# Patient Record
Sex: MALE | Race: WHITE | Employment: FULL TIME | URBAN - METROPOLITAN AREA
[De-identification: names, ages, dates, MRNs, and addresses within clinical notes are randomized per-mention and may not be internally consistent; named-entity substitution may affect disease eponyms.]

---

## 2022-10-11 ENCOUNTER — HOSPITAL ENCOUNTER (EMERGENCY)
Age: 47
Discharge: HOME | End: 2022-10-11
Attending: EMERGENCY MEDICINE

## 2022-10-11 ENCOUNTER — APPOINTMENT (OUTPATIENT)
Dept: ULTRASOUND IMAGING | Age: 47
End: 2022-10-11

## 2022-10-11 VITALS
HEIGHT: 68 IN | TEMPERATURE: 97.1 F | HEART RATE: 85 BPM | RESPIRATION RATE: 16 BRPM | DIASTOLIC BLOOD PRESSURE: 71 MMHG | WEIGHT: 200 LBS | BODY MASS INDEX: 30.31 KG/M2 | SYSTOLIC BLOOD PRESSURE: 138 MMHG | OXYGEN SATURATION: 97 %

## 2022-10-11 DIAGNOSIS — M71.22 BAKER'S CYST OF KNEE, LEFT: Primary | ICD-10-CM

## 2022-10-11 PROCEDURE — 93971 EXTREMITY STUDY: CPT

## 2022-10-11 PROCEDURE — 99284 EMERGENCY DEPT VISIT MOD MDM: CPT

## 2022-10-11 NOTE — ED NOTES
Pt states that his left lower calf has been very warm to touch and swollen, he also states that the swelling has increased to that leg, the area is red and warm to touch and tender, he denies shortness of breath.       Curly Cabrera RN  10/11/22 5431

## 2022-10-11 NOTE — ED PROVIDER NOTES
ED Attending shared visit  CC: No                                                                                                                                        Department of Emergency Medicine   ED  Provider Note  Admit Date/RoomTime: 10/11/2022 10:39 AM  ED Room: 34/34        HPI:  10/11/22,   Time: 10:39 AM EDT         Yoselin Hines is a 50 y.o. male presenting to the ED for left calf, beginning 10 days ago. The complaint has been persistent, moderate in severity, and worsened by nothing. 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 He states that he does not know of any injury or trauma. He started noticing some swelling in the leg out 10 days ago and has persisted. He tried to go to another facility yesterday but was told that he probably be waiting overnight to be seen. The patient is generally healthy. He denies any history of prior blood clots. No recent surgery. Denies chest pain or shortness of breath. States that he feels fine otherwise. ROS:     Constitutional: Negative for fever and chills  HENT: Negative for ear pain, sore throat and sinus pressure  Eyes: Negative for pain, discharge and redness  Respiratory:  Negative for shortness of breath, cough and wheezing  Cardiovascular: Negative for CP, edema or palpitations  Gastrointestinal: Negative for nausea, vomiting, diarrhea and abdominal distention  Genitourinary: Negative for dysuria and frequency  Musculoskeletal: See HPI  Skin: Negative for rash and wound  Neurological: Negative for weakness and headaches  Hematological: Negative for adenopathy    All other systems reviewed and are negative      -------------------------------- PAST HISTORY ----------------------------------  Past Medical History:  has no past medical history on file. Past Surgical History:  has no past surgical history on file.     Social History:      Family History: family history is not on file. The patients home medications have been reviewed. Allergies: Patient has no known allergies. --------------------------------- RESULTS ------------------------------------------  All laboratory and radiology results have been personally reviewed by myself   LABS:  No results found for this visit on 10/11/22. RADIOLOGY:  Interpreted by Radiologist.  US DUP LOWER EXTREMITY LEFT HUNTER   Final Result   No evidence of DVT in the left lower extremity.             ----------------- NURSING NOTES AND VITALS REVIEWED ---------------   The nursing notes within the ED encounter and vital signs as below have been reviewed. BP (!) 149/100   Pulse 92   Temp 97.6 °F (36.4 °C) (Tympanic)   Resp 18   Ht 5' 8\" (1.727 m)   Wt 200 lb (90.7 kg)   SpO2 97%   BMI 30.41 kg/m²   Oxygen Saturation Interpretation: Normal      --------------------------------PHYSICAL EXAM------------------------------------      Constitutional/General: Alert and oriented x3, well appearing, non toxic in NAD  Head: NC/AT  Eyes: PERRL, EOMI  Mouth: Oropharynx clear, handling secretions, no trismus  Neck: Supple, full ROM, no meningeal signs  Pulmonary: Lungs clear to auscultation bilaterally, no wheezes, rales, or rhonchi. Not in respiratory distress  Cardiovascular:  Regular rate and rhythm, no murmurs, gallops, or rubs. 2+ distal pulses  Abdomen: Soft, + BS. No distension. Nontender. No palpable rigidity, rebound or guarding  Extremities: Moves all extremities x 4. Marked swelling noted in left calf compared to right. No visible redness or rash. Left calf is tender to palpation. Warm and well perfused  Skin: warm and dry without rash  Neurologic: GCS 15,  Intact.   No focal deficits  Psych: Normal Affect      ------------------------ ED COURSE/MEDICAL DECISION MAKING----------------------  Medications - No data to display      Medical Decision Making:    Pt arrives with pain and swelling in left LE. Duplex negative for DVT. Pt does have large Baker's cyst.   Likely ruptured now. Discussed treatment plan. F/U Ortho as needed if persistent pain/symptoms. Swelling will take awhile to improve. Counseling: The emergency provider has spoken with the patient and discussed todays results, in addition to providing specific details for the plan of care and counseling regarding the diagnosis and prognosis. Questions are answered at this time and they are agreeable with the plan.      ------------------------ IMPRESSION AND DISPOSITION -------------------------------    IMPRESSION  1.  Baker's cyst of knee, left        DISPOSITION  Disposition: Discharge to home  Patient condition is stable                   Karma Martinez PA-C  10/11/22 1138       Karma Martinez PA-C  10/11/22 1147

## 2022-11-20 ENCOUNTER — APPOINTMENT (OUTPATIENT)
Dept: CT IMAGING | Age: 47
DRG: 394 | End: 2022-11-20

## 2022-11-20 ENCOUNTER — ANESTHESIA EVENT (OUTPATIENT)
Dept: OPERATING ROOM | Age: 47
DRG: 394 | End: 2022-11-20

## 2022-11-20 ENCOUNTER — HOSPITAL ENCOUNTER (INPATIENT)
Age: 47
LOS: 1 days | Discharge: HOME | DRG: 394 | End: 2022-11-21
Attending: EMERGENCY MEDICINE | Admitting: INTERNAL MEDICINE

## 2022-11-20 ENCOUNTER — ANESTHESIA (OUTPATIENT)
Dept: OPERATING ROOM | Age: 47
DRG: 394 | End: 2022-11-20

## 2022-11-20 DIAGNOSIS — A41.9 SEPTICEMIA (HCC): ICD-10-CM

## 2022-11-20 DIAGNOSIS — L02.91 PHLEGMON: Primary | ICD-10-CM

## 2022-11-20 DIAGNOSIS — L02.91 ABSCESS: ICD-10-CM

## 2022-11-20 PROBLEM — R03.0 ELEVATED BP WITHOUT DIAGNOSIS OF HYPERTENSION: Status: ACTIVE | Noted: 2022-11-20

## 2022-11-20 PROBLEM — Z78.9 ALCOHOL USE: Status: ACTIVE | Noted: 2022-11-20

## 2022-11-20 PROBLEM — R79.89 ELEVATED LACTIC ACID LEVEL: Status: ACTIVE | Noted: 2022-11-20

## 2022-11-20 LAB
ALBUMIN SERPL-MCNC: 4.6 G/DL (ref 3.5–5.2)
ALP BLD-CCNC: 77 U/L (ref 40–129)
ALT SERPL-CCNC: 37 U/L (ref 0–40)
ANION GAP SERPL CALCULATED.3IONS-SCNC: 16 MMOL/L (ref 7–16)
AST SERPL-CCNC: 32 U/L (ref 0–39)
BACTERIA: ABNORMAL /HPF
BASOPHILS ABSOLUTE: 0.07 E9/L (ref 0–0.2)
BASOPHILS RELATIVE PERCENT: 0.7 % (ref 0–2)
BILIRUB SERPL-MCNC: 1.1 MG/DL (ref 0–1.2)
BILIRUBIN URINE: NEGATIVE
BLOOD, URINE: NEGATIVE
BUN BLDV-MCNC: 6 MG/DL (ref 6–20)
C-REACTIVE PROTEIN: 13.8 MG/DL (ref 0–0.4)
CALCIUM SERPL-MCNC: 10 MG/DL (ref 8.6–10.2)
CHLORIDE BLD-SCNC: 97 MMOL/L (ref 98–107)
CLARITY: CLEAR
CO2: 23 MMOL/L (ref 22–29)
COLOR: YELLOW
CREAT SERPL-MCNC: 0.7 MG/DL (ref 0.7–1.2)
EOSINOPHILS ABSOLUTE: 0.02 E9/L (ref 0.05–0.5)
EOSINOPHILS RELATIVE PERCENT: 0.2 % (ref 0–6)
GFR SERPL CREATININE-BSD FRML MDRD: >60 ML/MIN/1.73
GLUCOSE BLD-MCNC: 98 MG/DL (ref 74–99)
GLUCOSE URINE: NEGATIVE MG/DL
HCT VFR BLD CALC: 44.7 % (ref 37–54)
HEMOGLOBIN: 15.1 G/DL (ref 12.5–16.5)
IMMATURE GRANULOCYTES #: 0.04 E9/L
IMMATURE GRANULOCYTES %: 0.4 % (ref 0–5)
INR BLD: 1.1
KETONES, URINE: 15 MG/DL
LACTIC ACID, SEPSIS: 2.1 MMOL/L (ref 0.5–1.9)
LEUKOCYTE ESTERASE, URINE: NEGATIVE
LYMPHOCYTES ABSOLUTE: 1.31 E9/L (ref 1.5–4)
LYMPHOCYTES RELATIVE PERCENT: 13 % (ref 20–42)
MCH RBC QN AUTO: 34.7 PG (ref 26–35)
MCHC RBC AUTO-ENTMCNC: 33.8 % (ref 32–34.5)
MCV RBC AUTO: 102.8 FL (ref 80–99.9)
MONOCYTES ABSOLUTE: 0.74 E9/L (ref 0.1–0.95)
MONOCYTES RELATIVE PERCENT: 7.3 % (ref 2–12)
NEUTROPHILS ABSOLUTE: 7.9 E9/L (ref 1.8–7.3)
NEUTROPHILS RELATIVE PERCENT: 78.4 % (ref 43–80)
NITRITE, URINE: NEGATIVE
PDW BLD-RTO: 12.6 FL (ref 11.5–15)
PH UA: 6 (ref 5–9)
PLATELET # BLD: 143 E9/L (ref 130–450)
PMV BLD AUTO: 9.4 FL (ref 7–12)
POTASSIUM REFLEX MAGNESIUM: 4 MMOL/L (ref 3.5–5)
PROCALCITONIN: 0.07 NG/ML (ref 0–0.08)
PROTEIN UA: NEGATIVE MG/DL
PROTHROMBIN TIME: 11.7 SEC (ref 9.3–12.4)
RBC # BLD: 4.35 E12/L (ref 3.8–5.8)
RBC UA: ABNORMAL /HPF (ref 0–2)
SODIUM BLD-SCNC: 136 MMOL/L (ref 132–146)
SPECIFIC GRAVITY UA: 1.01 (ref 1–1.03)
TOTAL PROTEIN: 8 G/DL (ref 6.4–8.3)
UROBILINOGEN, URINE: 0.2 E.U./DL
WBC # BLD: 10.1 E9/L (ref 4.5–11.5)
WBC UA: ABNORMAL /HPF (ref 0–5)

## 2022-11-20 PROCEDURE — 87102 FUNGUS ISOLATION CULTURE: CPT

## 2022-11-20 PROCEDURE — 85610 PROTHROMBIN TIME: CPT

## 2022-11-20 PROCEDURE — 87116 MYCOBACTERIA CULTURE: CPT

## 2022-11-20 PROCEDURE — 0D9P3ZZ DRAINAGE OF RECTUM, PERCUTANEOUS APPROACH: ICD-10-PCS | Performed by: SURGERY

## 2022-11-20 PROCEDURE — 80053 COMPREHEN METABOLIC PANEL: CPT

## 2022-11-20 PROCEDURE — 2060000000 HC ICU INTERMEDIATE R&B

## 2022-11-20 PROCEDURE — 96376 TX/PRO/DX INJ SAME DRUG ADON: CPT

## 2022-11-20 PROCEDURE — 85025 COMPLETE CBC W/AUTO DIFF WBC: CPT

## 2022-11-20 PROCEDURE — 6370000000 HC RX 637 (ALT 250 FOR IP)

## 2022-11-20 PROCEDURE — 2580000003 HC RX 258: Performed by: INTERNAL MEDICINE

## 2022-11-20 PROCEDURE — 2500000003 HC RX 250 WO HCPCS: Performed by: NURSE ANESTHETIST, CERTIFIED REGISTERED

## 2022-11-20 PROCEDURE — 2580000003 HC RX 258

## 2022-11-20 PROCEDURE — 6360000004 HC RX CONTRAST MEDICATION: Performed by: RADIOLOGY

## 2022-11-20 PROCEDURE — 96374 THER/PROPH/DIAG INJ IV PUSH: CPT

## 2022-11-20 PROCEDURE — 3700000001 HC ADD 15 MINUTES (ANESTHESIA): Performed by: SURGERY

## 2022-11-20 PROCEDURE — 3600000012 HC SURGERY LEVEL 2 ADDTL 15MIN: Performed by: SURGERY

## 2022-11-20 PROCEDURE — 87206 SMEAR FLUORESCENT/ACID STAI: CPT

## 2022-11-20 PROCEDURE — 87077 CULTURE AEROBIC IDENTIFY: CPT

## 2022-11-20 PROCEDURE — 87186 SC STD MICRODIL/AGAR DIL: CPT

## 2022-11-20 PROCEDURE — 74177 CT ABD & PELVIS W/CONTRAST: CPT

## 2022-11-20 PROCEDURE — 2500000003 HC RX 250 WO HCPCS: Performed by: SURGERY

## 2022-11-20 PROCEDURE — 87040 BLOOD CULTURE FOR BACTERIA: CPT

## 2022-11-20 PROCEDURE — 87176 TISSUE HOMOGENIZATION CULTR: CPT

## 2022-11-20 PROCEDURE — 87088 URINE BACTERIA CULTURE: CPT

## 2022-11-20 PROCEDURE — 86140 C-REACTIVE PROTEIN: CPT

## 2022-11-20 PROCEDURE — 99285 EMERGENCY DEPT VISIT HI MDM: CPT

## 2022-11-20 PROCEDURE — 7100000000 HC PACU RECOVERY - FIRST 15 MIN: Performed by: SURGERY

## 2022-11-20 PROCEDURE — 87075 CULTR BACTERIA EXCEPT BLOOD: CPT

## 2022-11-20 PROCEDURE — 87205 SMEAR GRAM STAIN: CPT

## 2022-11-20 PROCEDURE — 3600000002 HC SURGERY LEVEL 2 BASE: Performed by: SURGERY

## 2022-11-20 PROCEDURE — 2580000003 HC RX 258: Performed by: NURSE ANESTHETIST, CERTIFIED REGISTERED

## 2022-11-20 PROCEDURE — 81001 URINALYSIS AUTO W/SCOPE: CPT

## 2022-11-20 PROCEDURE — 6360000002 HC RX W HCPCS: Performed by: INTERNAL MEDICINE

## 2022-11-20 PROCEDURE — 2500000003 HC RX 250 WO HCPCS

## 2022-11-20 PROCEDURE — 83605 ASSAY OF LACTIC ACID: CPT

## 2022-11-20 PROCEDURE — 3700000000 HC ANESTHESIA ATTENDED CARE: Performed by: SURGERY

## 2022-11-20 PROCEDURE — 6360000002 HC RX W HCPCS

## 2022-11-20 PROCEDURE — 84145 PROCALCITONIN (PCT): CPT

## 2022-11-20 PROCEDURE — 7100000001 HC PACU RECOVERY - ADDTL 15 MIN: Performed by: SURGERY

## 2022-11-20 PROCEDURE — 6360000002 HC RX W HCPCS: Performed by: NURSE ANESTHETIST, CERTIFIED REGISTERED

## 2022-11-20 PROCEDURE — 99223 1ST HOSP IP/OBS HIGH 75: CPT | Performed by: INTERNAL MEDICINE

## 2022-11-20 PROCEDURE — 2709999900 HC NON-CHARGEABLE SUPPLY: Performed by: SURGERY

## 2022-11-20 PROCEDURE — 2580000003 HC RX 258: Performed by: SURGERY

## 2022-11-20 PROCEDURE — 99222 1ST HOSP IP/OBS MODERATE 55: CPT | Performed by: INTERNAL MEDICINE

## 2022-11-20 PROCEDURE — 87070 CULTURE OTHR SPECIMN AEROBIC: CPT

## 2022-11-20 RX ORDER — PROCHLORPERAZINE EDISYLATE 5 MG/ML
5 INJECTION INTRAMUSCULAR; INTRAVENOUS
Status: DISCONTINUED | OUTPATIENT
Start: 2022-11-20 | End: 2022-11-20 | Stop reason: HOSPADM

## 2022-11-20 RX ORDER — DIPHENHYDRAMINE HYDROCHLORIDE 50 MG/ML
12.5 INJECTION INTRAMUSCULAR; INTRAVENOUS
Status: DISCONTINUED | OUTPATIENT
Start: 2022-11-20 | End: 2022-11-20 | Stop reason: HOSPADM

## 2022-11-20 RX ORDER — FENTANYL CITRATE 50 UG/ML
50 INJECTION, SOLUTION INTRAMUSCULAR; INTRAVENOUS ONCE
Status: COMPLETED | OUTPATIENT
Start: 2022-11-20 | End: 2022-11-20

## 2022-11-20 RX ORDER — SODIUM CHLORIDE 0.9 % (FLUSH) 0.9 %
5-40 SYRINGE (ML) INJECTION EVERY 12 HOURS SCHEDULED
Status: DISCONTINUED | OUTPATIENT
Start: 2022-11-20 | End: 2022-11-20 | Stop reason: HOSPADM

## 2022-11-20 RX ORDER — ONDANSETRON 4 MG/1
4 TABLET, ORALLY DISINTEGRATING ORAL EVERY 8 HOURS PRN
Status: DISCONTINUED | OUTPATIENT
Start: 2022-11-20 | End: 2022-11-21 | Stop reason: HOSPADM

## 2022-11-20 RX ORDER — HYDRALAZINE HYDROCHLORIDE 20 MG/ML
5 INJECTION INTRAMUSCULAR; INTRAVENOUS
Status: DISCONTINUED | OUTPATIENT
Start: 2022-11-20 | End: 2022-11-20 | Stop reason: HOSPADM

## 2022-11-20 RX ORDER — ONDANSETRON 2 MG/ML
4 INJECTION INTRAMUSCULAR; INTRAVENOUS EVERY 6 HOURS PRN
Status: DISCONTINUED | OUTPATIENT
Start: 2022-11-20 | End: 2022-11-21 | Stop reason: HOSPADM

## 2022-11-20 RX ORDER — THIAMINE HYDROCHLORIDE 100 MG/ML
100 INJECTION, SOLUTION INTRAMUSCULAR; INTRAVENOUS DAILY
Status: DISCONTINUED | OUTPATIENT
Start: 2022-11-20 | End: 2022-11-21 | Stop reason: HOSPADM

## 2022-11-20 RX ORDER — 0.9 % SODIUM CHLORIDE 0.9 %
1000 INTRAVENOUS SOLUTION INTRAVENOUS ONCE
Status: COMPLETED | OUTPATIENT
Start: 2022-11-20 | End: 2022-11-20

## 2022-11-20 RX ORDER — SODIUM CHLORIDE, SODIUM LACTATE, POTASSIUM CHLORIDE, CALCIUM CHLORIDE 600; 310; 30; 20 MG/100ML; MG/100ML; MG/100ML; MG/100ML
INJECTION, SOLUTION INTRAVENOUS CONTINUOUS PRN
Status: DISCONTINUED | OUTPATIENT
Start: 2022-11-20 | End: 2022-11-20 | Stop reason: SDUPTHER

## 2022-11-20 RX ORDER — DEXAMETHASONE SODIUM PHOSPHATE 4 MG/ML
INJECTION, SOLUTION INTRA-ARTICULAR; INTRALESIONAL; INTRAMUSCULAR; INTRAVENOUS; SOFT TISSUE PRN
Status: DISCONTINUED | OUTPATIENT
Start: 2022-11-20 | End: 2022-11-20 | Stop reason: SDUPTHER

## 2022-11-20 RX ORDER — DIPHENHYDRAMINE HCL 25 MG
25 TABLET ORAL NIGHTLY PRN
Status: DISCONTINUED | OUTPATIENT
Start: 2022-11-20 | End: 2022-11-21 | Stop reason: HOSPADM

## 2022-11-20 RX ORDER — ACETAMINOPHEN 650 MG/1
650 SUPPOSITORY RECTAL EVERY 6 HOURS PRN
Status: DISCONTINUED | OUTPATIENT
Start: 2022-11-20 | End: 2022-11-21 | Stop reason: HOSPADM

## 2022-11-20 RX ORDER — SODIUM CHLORIDE 0.9 % (FLUSH) 0.9 %
5-40 SYRINGE (ML) INJECTION PRN
Status: DISCONTINUED | OUTPATIENT
Start: 2022-11-20 | End: 2022-11-20 | Stop reason: HOSPADM

## 2022-11-20 RX ORDER — ENOXAPARIN SODIUM 100 MG/ML
40 INJECTION SUBCUTANEOUS DAILY
Status: DISCONTINUED | OUTPATIENT
Start: 2022-11-20 | End: 2022-11-21 | Stop reason: HOSPADM

## 2022-11-20 RX ORDER — PROPOFOL 10 MG/ML
INJECTION, EMULSION INTRAVENOUS PRN
Status: DISCONTINUED | OUTPATIENT
Start: 2022-11-20 | End: 2022-11-20 | Stop reason: SDUPTHER

## 2022-11-20 RX ORDER — LIDOCAINE HYDROCHLORIDE AND EPINEPHRINE 10; 10 MG/ML; UG/ML
INJECTION, SOLUTION INFILTRATION; PERINEURAL PRN
Status: DISCONTINUED | OUTPATIENT
Start: 2022-11-20 | End: 2022-11-20 | Stop reason: ALTCHOICE

## 2022-11-20 RX ORDER — DEXTROSE AND SODIUM CHLORIDE 5; .45 G/100ML; G/100ML
INJECTION, SOLUTION INTRAVENOUS CONTINUOUS
Status: DISCONTINUED | OUTPATIENT
Start: 2022-11-20 | End: 2022-11-21 | Stop reason: HOSPADM

## 2022-11-20 RX ORDER — SODIUM CHLORIDE 9 MG/ML
INJECTION, SOLUTION INTRAVENOUS PRN
Status: DISCONTINUED | OUTPATIENT
Start: 2022-11-20 | End: 2022-11-20 | Stop reason: HOSPADM

## 2022-11-20 RX ORDER — ACETAMINOPHEN 500 MG
1000 TABLET ORAL ONCE
Status: COMPLETED | OUTPATIENT
Start: 2022-11-20 | End: 2022-11-20

## 2022-11-20 RX ORDER — ACETAMINOPHEN 325 MG/1
650 TABLET ORAL EVERY 6 HOURS PRN
Status: DISCONTINUED | OUTPATIENT
Start: 2022-11-20 | End: 2022-11-21 | Stop reason: HOSPADM

## 2022-11-20 RX ORDER — MIDAZOLAM HYDROCHLORIDE 1 MG/ML
INJECTION INTRAMUSCULAR; INTRAVENOUS PRN
Status: DISCONTINUED | OUTPATIENT
Start: 2022-11-20 | End: 2022-11-20 | Stop reason: SDUPTHER

## 2022-11-20 RX ORDER — SODIUM CHLORIDE 0.9 % (FLUSH) 0.9 %
5-40 SYRINGE (ML) INJECTION EVERY 12 HOURS SCHEDULED
Status: DISCONTINUED | OUTPATIENT
Start: 2022-11-20 | End: 2022-11-21 | Stop reason: HOSPADM

## 2022-11-20 RX ORDER — POLYETHYLENE GLYCOL 3350 17 G/17G
17 POWDER, FOR SOLUTION ORAL DAILY PRN
Status: DISCONTINUED | OUTPATIENT
Start: 2022-11-20 | End: 2022-11-21 | Stop reason: HOSPADM

## 2022-11-20 RX ORDER — SODIUM CHLORIDE 9 MG/ML
INJECTION, SOLUTION INTRAVENOUS PRN
Status: DISCONTINUED | OUTPATIENT
Start: 2022-11-20 | End: 2022-11-21 | Stop reason: HOSPADM

## 2022-11-20 RX ORDER — FENTANYL CITRATE 50 UG/ML
25 INJECTION, SOLUTION INTRAMUSCULAR; INTRAVENOUS EVERY 5 MIN PRN
Status: DISCONTINUED | OUTPATIENT
Start: 2022-11-20 | End: 2022-11-20 | Stop reason: HOSPADM

## 2022-11-20 RX ORDER — ONDANSETRON 2 MG/ML
INJECTION INTRAMUSCULAR; INTRAVENOUS PRN
Status: DISCONTINUED | OUTPATIENT
Start: 2022-11-20 | End: 2022-11-20 | Stop reason: SDUPTHER

## 2022-11-20 RX ORDER — MEPERIDINE HYDROCHLORIDE 25 MG/ML
12.5 INJECTION INTRAMUSCULAR; INTRAVENOUS; SUBCUTANEOUS ONCE
Status: DISCONTINUED | OUTPATIENT
Start: 2022-11-20 | End: 2022-11-20 | Stop reason: HOSPADM

## 2022-11-20 RX ORDER — SODIUM CHLORIDE 9 MG/ML
INJECTION, SOLUTION INTRAVENOUS CONTINUOUS
Status: DISCONTINUED | OUTPATIENT
Start: 2022-11-20 | End: 2022-11-20

## 2022-11-20 RX ORDER — LABETALOL HYDROCHLORIDE 5 MG/ML
5 INJECTION, SOLUTION INTRAVENOUS
Status: DISCONTINUED | OUTPATIENT
Start: 2022-11-20 | End: 2022-11-20 | Stop reason: HOSPADM

## 2022-11-20 RX ORDER — LIDOCAINE HYDROCHLORIDE 20 MG/ML
INJECTION, SOLUTION EPIDURAL; INFILTRATION; INTRACAUDAL; PERINEURAL PRN
Status: DISCONTINUED | OUTPATIENT
Start: 2022-11-20 | End: 2022-11-20 | Stop reason: SDUPTHER

## 2022-11-20 RX ORDER — HYDRALAZINE HYDROCHLORIDE 20 MG/ML
5 INJECTION INTRAMUSCULAR; INTRAVENOUS EVERY 6 HOURS PRN
Status: DISCONTINUED | OUTPATIENT
Start: 2022-11-20 | End: 2022-11-20

## 2022-11-20 RX ORDER — SODIUM CHLORIDE 0.9 % (FLUSH) 0.9 %
5-40 SYRINGE (ML) INJECTION PRN
Status: DISCONTINUED | OUTPATIENT
Start: 2022-11-20 | End: 2022-11-21 | Stop reason: HOSPADM

## 2022-11-20 RX ORDER — ROCURONIUM BROMIDE 10 MG/ML
INJECTION, SOLUTION INTRAVENOUS PRN
Status: DISCONTINUED | OUTPATIENT
Start: 2022-11-20 | End: 2022-11-20 | Stop reason: SDUPTHER

## 2022-11-20 RX ORDER — METRONIDAZOLE 500 MG/100ML
500 INJECTION, SOLUTION INTRAVENOUS EVERY 8 HOURS
Status: DISCONTINUED | OUTPATIENT
Start: 2022-11-20 | End: 2022-11-21 | Stop reason: HOSPADM

## 2022-11-20 RX ORDER — FENTANYL CITRATE 50 UG/ML
INJECTION, SOLUTION INTRAMUSCULAR; INTRAVENOUS PRN
Status: DISCONTINUED | OUTPATIENT
Start: 2022-11-20 | End: 2022-11-20 | Stop reason: SDUPTHER

## 2022-11-20 RX ORDER — HYDRALAZINE HYDROCHLORIDE 20 MG/ML
5 INJECTION INTRAMUSCULAR; INTRAVENOUS EVERY 6 HOURS PRN
Status: DISCONTINUED | OUTPATIENT
Start: 2022-11-20 | End: 2022-11-21 | Stop reason: HOSPADM

## 2022-11-20 RX ORDER — SUCCINYLCHOLINE/SOD CL,ISO/PF 200MG/10ML
SYRINGE (ML) INTRAVENOUS PRN
Status: DISCONTINUED | OUTPATIENT
Start: 2022-11-20 | End: 2022-11-20 | Stop reason: SDUPTHER

## 2022-11-20 RX ORDER — KETOROLAC TROMETHAMINE 30 MG/ML
15 INJECTION, SOLUTION INTRAMUSCULAR; INTRAVENOUS EVERY 6 HOURS PRN
Status: DISCONTINUED | OUTPATIENT
Start: 2022-11-20 | End: 2022-11-21 | Stop reason: HOSPADM

## 2022-11-20 RX ADMIN — SODIUM CHLORIDE: 9 INJECTION, SOLUTION INTRAVENOUS at 16:38

## 2022-11-20 RX ADMIN — FENTANYL CITRATE 100 MCG: 50 INJECTION, SOLUTION INTRAMUSCULAR; INTRAVENOUS at 19:38

## 2022-11-20 RX ADMIN — Medication 140 MG: at 19:06

## 2022-11-20 RX ADMIN — ONDANSETRON 4 MG: 2 INJECTION INTRAMUSCULAR; INTRAVENOUS at 19:20

## 2022-11-20 RX ADMIN — ACETAMINOPHEN 1000 MG: 500 TABLET ORAL at 15:02

## 2022-11-20 RX ADMIN — IOPAMIDOL 65 ML: 755 INJECTION, SOLUTION INTRAVENOUS at 14:23

## 2022-11-20 RX ADMIN — MIDAZOLAM 1 MG: 1 INJECTION INTRAMUSCULAR; INTRAVENOUS at 18:52

## 2022-11-20 RX ADMIN — ROCURONIUM BROMIDE 5 MG: 10 INJECTION, SOLUTION INTRAVENOUS at 19:06

## 2022-11-20 RX ADMIN — WATER 2000 MG: 1 INJECTION INTRAMUSCULAR; INTRAVENOUS; SUBCUTANEOUS at 15:23

## 2022-11-20 RX ADMIN — SODIUM CHLORIDE 1000 ML: 9 INJECTION, SOLUTION INTRAVENOUS at 15:03

## 2022-11-20 RX ADMIN — FENTANYL CITRATE 50 MCG: 50 INJECTION INTRAMUSCULAR; INTRAVENOUS at 15:02

## 2022-11-20 RX ADMIN — SODIUM CHLORIDE, PRESERVATIVE FREE 10 ML: 5 INJECTION INTRAVENOUS at 17:38

## 2022-11-20 RX ADMIN — FENTANYL CITRATE 50 MCG: 50 INJECTION INTRAMUSCULAR; INTRAVENOUS at 14:12

## 2022-11-20 RX ADMIN — KETOROLAC TROMETHAMINE 15 MG: 30 INJECTION, SOLUTION INTRAMUSCULAR; INTRAVENOUS at 20:13

## 2022-11-20 RX ADMIN — METRONIDAZOLE 500 MG: 500 INJECTION, SOLUTION INTRAVENOUS at 23:37

## 2022-11-20 RX ADMIN — DEXTROSE AND SODIUM CHLORIDE: 5; 450 INJECTION, SOLUTION INTRAVENOUS at 17:41

## 2022-11-20 RX ADMIN — SODIUM CHLORIDE 1000 ML: 9 INJECTION, SOLUTION INTRAVENOUS at 14:14

## 2022-11-20 RX ADMIN — ROCURONIUM BROMIDE 25 MG: 10 INJECTION, SOLUTION INTRAVENOUS at 19:12

## 2022-11-20 RX ADMIN — DEXTROSE AND SODIUM CHLORIDE: 5; 450 INJECTION, SOLUTION INTRAVENOUS at 21:21

## 2022-11-20 RX ADMIN — FENTANYL CITRATE 100 MCG: 50 INJECTION, SOLUTION INTRAMUSCULAR; INTRAVENOUS at 19:06

## 2022-11-20 RX ADMIN — DEXAMETHASONE SODIUM PHOSPHATE 10 MG: 4 INJECTION, SOLUTION INTRAMUSCULAR; INTRAVENOUS at 19:20

## 2022-11-20 RX ADMIN — LIDOCAINE HYDROCHLORIDE 100 MG: 20 INJECTION, SOLUTION EPIDURAL; INFILTRATION; INTRACAUDAL; PERINEURAL at 19:06

## 2022-11-20 RX ADMIN — SUGAMMADEX 500 MG: 100 INJECTION, SOLUTION INTRAVENOUS at 19:38

## 2022-11-20 RX ADMIN — FENTANYL CITRATE 100 MCG: 50 INJECTION, SOLUTION INTRAMUSCULAR; INTRAVENOUS at 19:41

## 2022-11-20 RX ADMIN — METRONIDAZOLE 500 MG: 500 INJECTION, SOLUTION INTRAVENOUS at 15:23

## 2022-11-20 RX ADMIN — PROPOFOL 180 MG: 10 INJECTION, EMULSION INTRAVENOUS at 19:06

## 2022-11-20 RX ADMIN — Medication 10 ML: at 21:29

## 2022-11-20 RX ADMIN — MIDAZOLAM 1 MG: 1 INJECTION INTRAMUSCULAR; INTRAVENOUS at 19:00

## 2022-11-20 RX ADMIN — SODIUM CHLORIDE, POTASSIUM CHLORIDE, SODIUM LACTATE AND CALCIUM CHLORIDE: 600; 310; 30; 20 INJECTION, SOLUTION INTRAVENOUS at 18:41

## 2022-11-20 RX ADMIN — THIAMINE HYDROCHLORIDE 100 MG: 100 INJECTION, SOLUTION INTRAMUSCULAR; INTRAVENOUS at 17:38

## 2022-11-20 ASSESSMENT — PAIN DESCRIPTION - LOCATION
LOCATION: PERINEUM
LOCATION: PERINEUM
LOCATION: RECTUM

## 2022-11-20 ASSESSMENT — ENCOUNTER SYMPTOMS
APNEA: 0
ABDOMINAL PAIN: 0
EYE ITCHING: 0
CHEST TIGHTNESS: 0
ABDOMINAL DISTENTION: 0
FACIAL SWELLING: 0
EYE DISCHARGE: 0
RECTAL PAIN: 1
COLOR CHANGE: 0
BACK PAIN: 0

## 2022-11-20 ASSESSMENT — PAIN DESCRIPTION - ORIENTATION
ORIENTATION: INNER
ORIENTATION: INNER
ORIENTATION: MID;INNER

## 2022-11-20 ASSESSMENT — PAIN DESCRIPTION - PAIN TYPE
TYPE: SURGICAL PAIN
TYPE: SURGICAL PAIN

## 2022-11-20 ASSESSMENT — PAIN DESCRIPTION - DESCRIPTORS
DESCRIPTORS: ACHING;DISCOMFORT;DULL
DESCRIPTORS: DISCOMFORT
DESCRIPTORS: DISCOMFORT

## 2022-11-20 ASSESSMENT — LIFESTYLE VARIABLES
HOW OFTEN DO YOU HAVE A DRINK CONTAINING ALCOHOL: 4 OR MORE TIMES A WEEK
SMOKING_STATUS: 0
HOW MANY STANDARD DRINKS CONTAINING ALCOHOL DO YOU HAVE ON A TYPICAL DAY: 3 OR 4

## 2022-11-20 ASSESSMENT — PAIN - FUNCTIONAL ASSESSMENT: PAIN_FUNCTIONAL_ASSESSMENT: 0-10

## 2022-11-20 ASSESSMENT — PAIN SCALES - GENERAL
PAINLEVEL_OUTOF10: 4
PAINLEVEL_OUTOF10: 3
PAINLEVEL_OUTOF10: 8
PAINLEVEL_OUTOF10: 3
PAINLEVEL_OUTOF10: 3
PAINLEVEL_OUTOF10: 6
PAINLEVEL_OUTOF10: 2
PAINLEVEL_OUTOF10: 3

## 2022-11-20 NOTE — PLAN OF CARE
Problem: Discharge Planning  Goal: Discharge to home or other facility with appropriate resources  Outcome: Progressing  Flowsheets  Taken 11/20/2022 1825  Discharge to home or other facility with appropriate resources:   Identify barriers to discharge with patient and caregiver   Arrange for needed discharge resources and transportation as appropriate   Identify discharge learning needs (meds, wound care, etc)   Arrange for interpreters to assist at discharge as needed   Refer to discharge planning if patient needs post-hospital services based on physician order or complex needs related to functional status, cognitive ability or social support system  Taken 11/20/2022 1645  Discharge to home or other facility with appropriate resources:   Identify barriers to discharge with patient and caregiver   Identify discharge learning needs (meds, wound care, etc)   Refer to discharge planning if patient needs post-hospital services based on physician order or complex needs related to functional status, cognitive ability or social support system   Arrange for needed discharge resources and transportation as appropriate   Arrange for interpreters to assist at discharge as needed     Problem: Pain  Goal: Verbalizes/displays adequate comfort level or baseline comfort level  Outcome: Progressing  Flowsheets (Taken 11/20/2022 1648)  Verbalizes/displays adequate comfort level or baseline comfort level:   Encourage patient to monitor pain and request assistance   Assess pain using appropriate pain scale   Administer analgesics based on type and severity of pain and evaluate response   Implement non-pharmacological measures as appropriate and evaluate response   Consider cultural and social influences on pain and pain management   Notify Licensed Independent Practitioner if interventions unsuccessful or patient reports new pain

## 2022-11-20 NOTE — H&P
[facility] History and Physical    CHIEF COMPLAINT:  generalized pain     History of Present Illness: This is a 50year old male who denies past medical and surgical history. He declines seeing family doctor for last decade. Was recently in ER with ruptured bakers cyst to left leg October 2022. Patient presented to ER with complaint of generalized weakness, fever, and pain over the last two days. Lactic acid in ER 2.1, WBC WNL. CT a/p showed 8 x 2.8 mm left perirectal phlegmon. He was given IVF, Fentanyl, Tylenol, Ceftriaxone and Flagyl in ER. Patient refusing to allow examination of rectal area and denies any immunodeficiencies or anal intercourse. Surgery consulted and decision made to admit. Informant(s) for H&P:patient     REVIEW OF SYSTEMS:  A comprehensive review of systems was negative except for: what is in the HPI      PMH:  No past medical history on file. Surgical History:  No past surgical history on file. Medications Prior to Admission:    Prior to Admission medications    Not on File       Allergies:    Patient has no known allergies. Social History:        Family History:   family history is not on file.    Unknown       PHYSICAL EXAM:  Vitals:  BP (!) 146/84   Pulse 94   Temp 98 °F (36.7 °C) (Oral)   Resp 18   Ht 5' 8\" (1.727 m)   Wt 205 lb (93 kg)   SpO2 97%   BMI 31.17 kg/m²     General Appearance: alert and oriented to person, place and time and in no acute distress - inc bmi   Skin: warm and dry  Head: normocephalic and atraumatic  Eyes: pupils equal, round, and reactive to light, extraocular eye movements intact, conjunctivae normal  Neck: neck supple and non tender without mass   Pulmonary/Chest: clear to auscultation bilaterally- no wheezes, rales or rhonchi, normal air movement, no respiratory distress  Cardiovascular: normal rate, normal S1 and S2 and no carotid bruits  Abdomen: soft, non-tender, non-distended, normal bowel sounds, no masses or organomegaly  Extremities: no cyanosis, no clubbing and no edema  Neurologic: no cranial nerve deficit and speech normal  Refused examination of perirectal area    LABS:  Recent Labs     11/20/22  1315      K 4.0   CL 97*   CO2 23   BUN 6   CREATININE 0.7   GLUCOSE 98   CALCIUM 10.0       Recent Labs     11/20/22  1315   WBC 10.1   RBC 4.35   HGB 15.1   HCT 44.7   .8*   MCH 34.7   MCHC 33.8   RDW 12.6      MPV 9.4       No results for input(s): POCGLU in the last 72 hours. Radiology:   CT ABDOMEN PELVIS W IV CONTRAST Additional Contrast? None   Final Result   8 x 2.8 mm left Sharon rectal phlegmon with induration of the adjacent fat   compatible with cellulitis. EKG: not done       ASSESSMENT:      Principal Problem:    Phlegmon  Resolved Problems:    * No resolved hospital problems. *    PLAN:    1. Left Perirectal Phlegmon   Subjective fever, weakness, and pain over last few days. Given Fentanyl and Tylenol in Er. Refusing examination of area. Consult to general surgery. Given Ceftriaxone and Flagyl in ER , will continue on Flagyl for now. NPO until surgery evaluates. If no surgery plans, ok for diet. Will give Toradol for pain. PCT and CRP pending. Will obtain HIV panel, hepatitis panel tomorrow. 2. Hypertension - BP elevated. Does not currently take HTN medications. Will continue to monitor. Likely a pain response. Hydralazine PRN   3. Lactic acidosis - L. A. 2.1 - IVF bolus given in ER. Will continue on IV at 100 mL/hour for at least 24 hours. Blood and urine cultures pending. 4. History of ruptured Baker's cyst to left leg October 2022. Resolved   5. 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 Denies medical history. Will check A1C to determine if patient needs on medications.      Code Status: FULL   DVT prophylaxis: Lovenox     40 minutes time spent reviewing patient chart, assessing patient, discussing plan of care with patient and family, discussing plan of care with collaborating physician, and charting. NOTE: This report was transcribed using voice recognition software. Every effort was made to ensure accuracy; however, inadvertent computerized transcription errors may be present. Electronically signed by PATRICE Figueroa NP on 11/20/2022 at 3:38 PM   HOSPITALIST ATTENDING PHYSICIAN NOTE 11/20/2022 1711PM:    Details of the evaluation - subjective assessment (including medication profile, past medical, family and social history when applicable), examination, review of lab and test data, diagnostic impressions and medical decision making - performed by PATRICE Figueroa NP, were discussed with me on the date of service and I agree with clinical information herein unless otherwise noted. The patient has been evaluated by me personally earlier today. Pt reports no fevers, chills,n/v. Pt does drink some whiskey every night per nursing    PHYSICAL EXAM:    Vitals:  /82   Pulse 95   Temp 98.3 °F (36.8 °C) (Oral)   Resp 18   Ht 5' 8\" (1.727 m)   Wt 205 lb (93 kg)   SpO2 98%   BMI 31.17 kg/m²     General:  Appears comfortable. Answers questions appropriately and cooperative with exam  HEENT:  Mucous membranes moist. No erythema, rhinorrhea, or post-nasal drip noted. Neck:  No carotid bruits. Heart:  Rhythm regular at rate of 96  Lungs:  CTA. No wheeze, rales, or rhonchi  Abdomen:  Positive bowel sounds positive. Soft. Non-tender. No guarding, rebound or rigidity. Breast/Rectal/Genitourinary: not pertinent. Extremities:  Negative for lower extremity edema  Skin:  Warm and dry  Vascular: 2/4 Dorsalis Pedis pulses bilaterally. Neuro:  Cranial nerves 2-12 grossly intact, no focal weakness or change in sensation noted. Extraocular muscles intact. Pupils equal, round, reactive to light. Pt declines rectal exam/inspection but would be willing to allow to inspect area.      I agree with the assessment and plan of Abby Michael, APRN - NP    Phlegmon  Elevated lactic acid level  Alcohol use  Elevated bp without dx of htn      Electronically signed by Serafin Burr D.O.   Hospitalist  [facility] at [facility], [place]

## 2022-11-20 NOTE — ED PROVIDER NOTES
Talat Kapoor 50 y.o. male healthy, no significant PHMx presents to the ED c/o fevers, perirectal pain. Onset: 2 days ago. Location/Radiation: Perirectal region. Duration: Constant. Characterization: Sharp. Aggravating Factors: Touching the region, sitting down, trying have a bowel movement. Relieving Factors: None. Severity: 10 out of 10. Denies any anal and anal intercourse, IV drug use, immunodeficiencies. Assx Sxs: Fever/chills, rectal pain. He denies: Abdominal pain, nausea/vomiting, syncope, numbness/tingling        Review of Systems   Constitutional:  Positive for chills and fever. Negative for activity change and appetite change. HENT:  Negative for congestion and facial swelling. Eyes:  Negative for discharge and itching. Respiratory:  Negative for apnea and chest tightness. Gastrointestinal:  Positive for rectal pain. Negative for abdominal distention and abdominal pain. Endocrine: Negative for cold intolerance and heat intolerance. Genitourinary:  Negative for dysuria and enuresis. Musculoskeletal:  Negative for arthralgias and back pain. Skin:  Negative for color change and pallor. Allergic/Immunologic: Negative for environmental allergies and food allergies. Neurological:  Negative for dizziness and facial asymmetry. Hematological:  Negative for adenopathy. Does not bruise/bleed easily. Psychiatric/Behavioral:  Negative for agitation and behavioral problems. Physical Exam  Constitutional:       General: He is not in acute distress. Appearance: He is normal weight. He is ill-appearing. He is not toxic-appearing or diaphoretic. HENT:      Head: Normocephalic and atraumatic. Right Ear: External ear normal.      Left Ear: External ear normal.      Nose: Nose normal.      Mouth/Throat:      Mouth: Mucous membranes are moist.      Pharynx: Oropharynx is clear. Eyes:      Extraocular Movements: Extraocular movements intact.       Pupils: Pupils are equal, round, and reactive to light. Cardiovascular:      Rate and Rhythm: Normal rate and regular rhythm. Pulses: Normal pulses. Heart sounds: Normal heart sounds. Pulmonary:      Effort: Pulmonary effort is normal. No respiratory distress. Breath sounds: Normal breath sounds. No wheezing or rales. Abdominal:      General: There is no distension. Palpations: Abdomen is soft. Tenderness: There is no abdominal tenderness. Musculoskeletal:         General: Normal range of motion. Cervical back: Normal range of motion and neck supple. Right lower leg: No edema. Left lower leg: No edema. Skin:     General: Skin is warm and dry. Capillary Refill: Capillary refill takes less than 2 seconds. Coloration: Skin is not jaundiced. Findings: No bruising or lesion. Neurological:      General: No focal deficit present. Mental Status: He is alert and oriented to person, place, and time. Cranial Nerves: No cranial nerve deficit. Motor: No weakness. Psychiatric:         Mood and Affect: Mood normal.         Thought Content: Thought content normal.        Procedures     MDM  Number of Diagnoses or Management Options  Abscess  Phlegmon  Septicemia ([facility] [address]  Diagnosis management comments: This is a healthy 51 YO M that presents to the ED c/o of perirectal pain and fevers greater than 100.4 F. Upon evaluation of the Pt he was Aox4, tachycardic, normotensive, non-hypoxic on room air, in obvious pain. Attempt at rectal exam was done but Pt reports he was in too much pain for this. Sepsis labs ordered, blood cultures x2, urine cultures, crp, pct collected. Pt's lactic acid 2.1, no leukocytosis, normal H/H, electrolytes normal, normal kidney function, UA normal. Pt's CT scan abd/pelvis shows 8cm x 2.8cm viri-rectal phlegmon with induration of the adjacent fat compatible with cellulitis.  Pt given IV pain meds, IV fluids, 2gram rocephin, 500mg flagyl AFTER blood culturesx2, and urine cultures collected. Disscuess case with Hospitalist and they agreed to admit as primary team, discussed case with General Surgery who agreed to consult. Pt verbally consented and agreed to the plan. He was made NPO. Pt stable at time of admission. ED Course as of 11/20/22 1600   Sun Nov 20, 2022   1508   SEP-1 CORE MEASURE DATA      Sepsis Criteria     Must be confirmed or suspected to move forward with diagnosis of sepsis. Must meet 2:    [YES ] Temperature > 100.9 F (38.3 C)        or < 96.8 F (36 C)  [ YES] HR > 90  [ ] RR > 20  [ ] WBC > 12 or < 4 or 10% bands      AND:      [ Estil Julio Infection Confirmed or        Suspected. Must meet 1:    [YES ] Lactate > 2       or   [ NO] Signs of Organ Dysfunction:    - SBP < 90 or MAP < 65  - Altered mental status  - Creatinine > 2 or increased from      baseline  - Urine Output < 0.5 ml/kg/hr  - Bilirubin > 2  - INR > 1.5 (not anticoagulated)  - Platelets < 673,162  - Acute Respiratory Failure as     evidenced by new need for NIPPV     or mechanical ventilation      [ ] No criteria met for Severe Sepsis. Must meet 1:    [ ] Lactate > 4        or   [ ] SBP < 90 or MAP < 65 for at        least two readings in the first        hour after fluid bolus        administration      [ ] Vasopressors initiated (if hypotension persists after fluid resuscitation)        [ ] No criteria met for Septic Shock. @[facility]([id number]:1::1:0)@  @LABRCNT(WBC:3,LACTA:3,CREATININE:3,BILITOT:3,INR:3,PLT:3)@     Time 12pm Identified: sepsis    Fluid Resuscitation Rational: at least 30mL/kg based on entered actual weight at time of triage    Repeat lactate level: ordered and pending at this time    Reassessment Exam:   I have reassessed tissue perfusion and hemodynamic status after fluid bolus at this time: 1509    [JR]   [address] agreed to admit pt. Desireeing general surgery [JR]   [facility] Surgery called back and agreed to consult.  Pt made NPO  [JR]      ED Course User Index  [JR] Maria Elena Sin DO     ED Course as of 11/20/22 1600   Sun Nov 20, 2022   1508   SEP-1 CORE MEASURE DATA      Sepsis Criteria     Must be confirmed or suspected to move forward with diagnosis of sepsis. Must meet 2:    [YES ] Temperature > 100.9 F (38.3 C)        or < 96.8 F (36 C)  [ YES] HR > 90  [ ] RR > 20  [ ] WBC > 12 or < 4 or 10% bands      AND:      [ Keira Dove Infection Confirmed or        Suspected. Must meet 1:    [YES ] Lactate > 2       or   [ NO] Signs of Organ Dysfunction:    - SBP < 90 or MAP < 65  - Altered mental status  - Creatinine > 2 or increased from      baseline  - Urine Output < 0.5 ml/kg/hr  - Bilirubin > 2  - INR > 1.5 (not anticoagulated)  - Platelets < 907,763  - Acute Respiratory Failure as     evidenced by new need for NIPPV     or mechanical ventilation      [ ] No criteria met for Severe Sepsis. Must meet 1:    [ ] Lactate > 4        or   [ ] SBP < 90 or MAP < 65 for at        least two readings in the first        hour after fluid bolus        administration      [ ] Vasopressors initiated (if hypotension persists after fluid resuscitation)        [ ] No criteria met for Septic Shock. @[facility]([id number]:1::1:0)@  @LABRCNT(WBC:3,LACTA:3,CREATININE:3,BILITOT:3,INR:3,PLT:3)@     Time 12pm Identified: sepsis    Fluid Resuscitation Rational: at least 30mL/kg based on entered actual weight at time of triage    Repeat lactate level: ordered and pending at this time    Reassessment Exam:   I have reassessed tissue perfusion and hemodynamic status after fluid bolus at this time: 1509    [JR]   [address] agreed to admit pt. [facility] general surgery [JR]   [facility] Surgery called back and agreed to consult.  Pt made NPO  [JR]      ED Course User Index  [JR] Maria Elena Sin DO       --------------------------------------------- PAST HISTORY ---------------------------------------------  Past Medical History:  has no past medical history on file. Past Surgical History:  has no past surgical history on file. Social History:      Family History: family history is not on file. The patients home medications have been reviewed. Allergies: Patient has no known allergies.     -------------------------------------------------- RESULTS -------------------------------------------------    LABS:  Results for orders placed or performed during the hospital encounter of 11/20/22   Lactate, Sepsis   Result Value Ref Range    Lactic Acid, Sepsis 2.1 (H) 0.5 - 1.9 mmol/L   CBC with Auto Differential   Result Value Ref Range    WBC 10.1 4.5 - 11.5 E9/L    RBC 4.35 3.80 - 5.80 E12/L    Hemoglobin 15.1 12.5 - 16.5 g/dL    Hematocrit 44.7 37.0 - 54.0 %    .8 (H) 80.0 - 99.9 fL    MCH 34.7 26.0 - 35.0 pg    MCHC 33.8 32.0 - 34.5 %    RDW 12.6 11.5 - 15.0 fL    Platelets 232 257 - 135 E9/L    MPV 9.4 7.0 - 12.0 fL    Neutrophils % 78.4 43.0 - 80.0 %    Immature Granulocytes % 0.4 0.0 - 5.0 %    Lymphocytes % 13.0 (L) 20.0 - 42.0 %    Monocytes % 7.3 2.0 - 12.0 %    Eosinophils % 0.2 0.0 - 6.0 %    Basophils % 0.7 0.0 - 2.0 %    Neutrophils Absolute 7.90 (H) 1.80 - 7.30 E9/L    Immature Granulocytes # 0.04 E9/L    Lymphocytes Absolute 1.31 (L) 1.50 - 4.00 E9/L    Monocytes Absolute 0.74 0.10 - 0.95 E9/L    Eosinophils Absolute 0.02 (L) 0.05 - 0.50 E9/L    Basophils Absolute 0.07 0.00 - 0.20 E9/L   Comprehensive Metabolic Panel w/ Reflex to MG   Result Value Ref Range    Sodium 136 132 - 146 mmol/L    Potassium reflex Magnesium 4.0 3.5 - 5.0 mmol/L    Chloride 97 (L) 98 - 107 mmol/L    CO2 23 22 - 29 mmol/L    Anion Gap 16 7 - 16 mmol/L    Glucose 98 74 - 99 mg/dL    BUN 6 6 - 20 mg/dL    Creatinine 0.7 0.7 - 1.2 mg/dL    Est, Glom Filt Rate >60 >=60 mL/min/1.73    Calcium 10.0 8.6 - 10.2 mg/dL    Total Protein 8.0 6.4 - 8.3 g/dL    Albumin 4.6 3.5 - 5.2 g/dL    Total Bilirubin 1.1 0.0 - 1.2 mg/dL    Alkaline Phosphatase 77 40 - 129 U/L ALT 37 0 - 40 U/L    AST 32 0 - 39 U/L   Urinalysis with Microscopic   Result Value Ref Range    Color, UA Yellow Straw/Yellow    Clarity, UA Clear Clear    Glucose, Ur Negative Negative mg/dL    Bilirubin Urine Negative Negative    Ketones, Urine 15 (A) Negative mg/dL    Specific Gravity, UA 1.015 1.005 - 1.030    Blood, Urine Negative Negative    pH, UA 6.0 5.0 - 9.0    Protein, UA Negative Negative mg/dL    Urobilinogen, Urine 0.2 <2.0 E.U./dL    Nitrite, Urine Negative Negative    Leukocyte Esterase, Urine Negative Negative    WBC, UA 0-1 0 - 5 /HPF    RBC, UA 0-1 0 - 2 /HPF    Bacteria, UA RARE (A) None Seen /HPF   Protime-INR   Result Value Ref Range    Protime 11.7 9.3 - 12.4 sec    INR 1.1        RADIOLOGY:  CT ABDOMEN PELVIS W IV CONTRAST Additional Contrast? None   Final Result   8 x 2.8 mm left Sharon rectal phlegmon with induration of the adjacent fat   compatible with cellulitis. E  ------------------------- NURSING NOTES AND VITALS REVIEWED ---------------------------  Date / Time Roomed:  11/20/2022 11:58 AM  ED Bed Assignment:  02/02    The nursing notes within the ED encounter and vital signs as below have been reviewed. Patient Vitals for the past 24 hrs:   BP Temp Temp src Pulse Resp SpO2 Height Weight   11/20/22 1533 (!) 146/84 98 °F (36.7 °C) Oral 94 18 97 % -- --   11/20/22 1417 (!) 155/88 -- -- 96 18 96 % -- --   11/20/22 1156 (!) 160/96 97.5 °F (36.4 °C) Temporal (!) 116 16 97 % 5' 8\" (1.727 m) 205 lb (93 kg)       Oxygen Saturation Interpretation: Normal    ------------------------------------------ PROGRESS NOTES ------------------------------------------  Re-evaluation(s):  Time: 1500  Patients symptoms show no change  Repeat physical examination is not changed    Counseling:  I have spoken with the patient and discussed todays results, in addition to providing specific details for the plan of care and counseling regarding the diagnosis and prognosis.   Their questions are answered at this time and they are agreeable with the plan of admission.    --------------------------------- ADDITIONAL PROVIDER NOTES ---------------------------------  Consultations:  Time: 5779. Spoke with Dr. Jil Campbell. Discussed case. They will admit the patient. This patient's ED course included: a personal history and physicial examination, re-evaluation prior to disposition, multiple bedside re-evaluations, IV medications, cardiac monitoring, and continuous pulse oximetry    This patient has remained hemodynamically stable during their ED course. Diagnosis:  1. Phlegmon    2. Abscess    3. Septicemia ([facility] [address]        Disposition:  Patient's disposition: Admit to med/surg floor  Patient's condition is stable.          Elkin Shelby DO  Resident  11/20/22 1600

## 2022-11-20 NOTE — ANESTHESIA PRE PROCEDURE
Department of Anesthesiology  Preprocedure Note       Name:  Will Marcano   Age:  50 y.o.  :  1975                                          MRN:  [id number]         Date:  2022      Surgeon: Zaid Perales MD    Procedure:  Incision and drainage of perirectal abscess/phlegmon    Medications prior to admission:   Prior to Admission medications    Not on File       Current medications:    Current Facility-Administered Medications   Medication Dose Route Frequency Provider Last Rate Last Admin    metronidazole (FLAGYL) 500 mg in 0.9% NaCl 100 mL IVPB premix  500 mg IntraVENous Q8H Renetta Mohan DO   Stopped at 22 1638    sodium chloride flush 0.9 % injection 5-40 mL  5-40 mL IntraVENous 2 times per day Emerson Ventura, APRN - NP        sodium chloride flush 0.9 % injection 5-40 mL  5-40 mL IntraVENous PRN Emerson Ventura, APRN - NP        0.9 % sodium chloride infusion   IntraVENous PRN Emerson Ventura, APRN - NP        enoxaparin (LOVENOX) injection 40 mg  40 mg SubCUTAneous Daily Emerson Ventura, APRN - NP        ondansetron (ZOFRAN-ODT) disintegrating tablet 4 mg  4 mg Oral Q8H PRN Emerson Ventura, APRN - NP        Or    ondansetron [facility]) injection 4 mg  4 mg IntraVENous Q6H PRN Emerson Ventura, APRN - NP        polyethylene glycol [facility]) packet 17 g  17 g Oral Daily PRN Emerson Ventura, APRN - NP        acetaminophen (TYLENOL) tablet 650 mg  650 mg Oral Q6H PRN Emerson Ventura, APRN - NP        Or   Shital Sabrina acetaminophen (TYLENOL) suppository 650 mg  650 mg Rectal Q6H PRN Emerson Ventura, APRN - NP        hydrALAZINE (APRESOLINE) injection 5 mg  5 mg IntraVENous Q6H PRN Emerson Ventura, APRN - NP        ketorolac (TORADOL) injection 15 mg  15 mg IntraVENous Q6H PRN Emerson Ventura, APRN - NP        diphenhydrAMINE (BENADRYL) tablet 25 mg  25 mg Oral Nightly PRN Orlando Hric, DO        dextrose 5 % and 0.45 % sodium chloride infusion   IntraVENous Continuous Orlando Hric, DO        thiamine (B-1) injection 100 mg  100 mg IntraVENous Daily Orlando Hric, DO           Allergies:  No Known Allergies    Problem List:    Patient Active Problem List   Diagnosis Code    Phlegmon L02.91    Elevated lactic acid level R79.89    Alcohol use Z78.9    Elevated BP without diagnosis of hypertension R03.0       Past Medical History:  History reviewed. No pertinent past medical history. Past Surgical History:  No past surgical history on file. Social History:    Social History     Tobacco Use    Smoking status: Not on file    Smokeless tobacco: Not on file   Substance Use Topics    Alcohol use: Not on file                                Counseling given: Not Answered      Vital Signs (Current):   Vitals:    11/20/22 1156 11/20/22 1417 11/20/22 1533 11/20/22 1648   BP: (!) 160/96 (!) 155/88 (!) 146/84 132/82   Pulse: (!) 116 96 94 95   Resp: 16 18 18 18   Temp: 97.5 °F (36.4 °C)  98 °F (36.7 °C) 98.3 °F (36.8 °C)   TempSrc: Temporal  Oral Oral   SpO2: 97% 96% 97% 98%   Weight: 205 lb (93 kg)      Height: 5' 8\" (1.727 m)                                                 BP Readings from Last 3 Encounters:   11/20/22 132/82   10/11/22 138/71       NPO Status:                                                                                 BMI:   Wt Readings from Last 3 Encounters:   11/20/22 205 lb (93 kg)   10/11/22 200 lb (90.7 kg)     Body mass index is 31.17 kg/m².     CBC:   Lab Results   Component Value Date/Time    WBC 10.1 11/20/2022 01:15 PM    RBC 4.35 11/20/2022 01:15 PM    HGB 15.1 11/20/2022 01:15 PM    HCT 44.7 11/20/2022 01:15 PM    .8 11/20/2022 01:15 PM    RDW 12.6 11/20/2022 01:15 PM     11/20/2022 01:15 PM       CMP:   Lab Results   Component Value Date/Time     11/20/2022 01:15 PM    K 4.0 11/20/2022 01:15 PM    CL 97 11/20/2022 01:15 PM    CO2 23 11/20/2022 01:15 PM    BUN 6 11/20/2022 01:15 PM    CREATININE 0.7 11/20/2022 01:15 PM    LABGLOM >60 11/20/2022 01:15 PM    GLUCOSE 98 11/20/2022 01:15 PM    PROT 8.0 11/20/2022 01:15 PM    CALCIUM 10.0 11/20/2022 01:15 PM    BILITOT 1.1 11/20/2022 01:15 PM    ALKPHOS 77 11/20/2022 01:15 PM    AST 32 11/20/2022 01:15 PM    ALT 37 11/20/2022 01:15 PM       POC Tests: No results for input(s): POCGLU, POCNA, POCK, POCCL, POCBUN, POCHEMO, POCHCT in the last 72 hours. Coags:   Lab Results   Component Value Date/Time    PROTIME 11.7 11/20/2022 01:15 PM    INR 1.1 11/20/2022 01:15 PM       HCG (If Applicable): No results found for: PREGTESTUR, PREGSERUM, HCG, HCGQUANT     ABGs: No results found for: PHART, PO2ART, OEE7AVL, XYS9MDY, BEART, H9IIMSSI     Type & Screen (If Applicable):  No results found for: LABABO, LABRH    Drug/Infectious Status (If Applicable):  No results found for: HIV, HEPCAB    COVID-19 Screening (If Applicable): No results found for: COVID19      CT:  Narrative   EXAMINATION:   CT OF THE ABDOMEN AND PELVIS WITH CONTRAST 11/20/2022 2:26 pm       TECHNIQUE:   CT of the abdomen and pelvis was performed with the administration of   intravenous contrast. Multiplanar reformatted images are provided for review. Automated exposure control, iterative reconstruction, and/or weight based   adjustment of the mA/kV was utilized to reduce the radiation dose to as low   as reasonably achievable.       COMPARISON:   None.       HISTORY:   ORDERING SYSTEM PROVIDED HISTORY: concern for abscess in the perianal region   TECHNOLOGIST PROVIDED HISTORY:   Additional Contrast?->None   Reason for exam:->concern for abscess in the perianal region   Decision Support Exception - unselect if not a suspected or confirmed   emergency medical condition->Emergency Medical Condition (MA)       FINDINGS:   Lower Chest: No pleural or pericardial effusions are seen.       Organs: No focal liver masses are seen.  No splenic masses are identified.    No peripancreatic fluid is noted.  Diminished attenuation of the liver   relative to the spleen is noted which could reflect fatty infiltration or   other parenchymal process within the liver.  The portal venous system is   patent.  The adrenal glands are negative.  Kidneys enhance as expected.  The   aorta is normal caliber.       There is no evidence for abscess or free air.  The large and small bowel   caliber is within normal limits.  A paraumbilical hernia containing fat only   is noted.  The fascial defect measures 1.6 mm.  No bladder calculi are seen. Vascular phleboliths are noted within the pelvis.  Induration of fat in the   perianal region is noted on image 211 measuring 8 x 2.8 mm.  Finding may   reflect phlegmonous inflammatory change in the perirectal region in the   absence of a known history of neoplastic disease. Anesthesia Evaluation  Patient summary reviewed and Nursing notes reviewed no history of anesthetic complications:   Airway: Mallampati: III  TM distance: <3 FB   Neck ROM: full  Mouth opening: > = 3 FB   Dental:          Pulmonary:Negative Pulmonary ROS and normal exam  breath sounds clear to auscultation      (-) COPD, asthma, sleep apnea and not a current smoker                           Cardiovascular:  Exercise tolerance: good (>4 METS),   (+) dysrhythmias (History of palpitations):,     (-) past MI, CAD, CABG/stent and  anginaHypertension: Elevated BP w/o overt dx. of HTN. Rhythm: regular  Rate: normal           Beta Blocker:  Not on Beta Blocker         Neuro/Psych:   Negative Neuro/Psych ROS     (-) seizures, TIA and CVA           GI/Hepatic/Renal:   (+) GERD: well controlled,      (-) hepatitis and no renal disease      ROS comment: EtOH abuse. Endo/Other: Negative Endo/Other ROS       (-) diabetes mellitus, blood dyscrasia        Pt had no PAT visit       Abdominal:         (-) obese       Vascular: negative vascular ROS. - DVT and PE.       Other Findings:           Anesthesia Plan      general     ASA 2 - emergent (Modified RSI with HOB at 30 degrees RT  Pre-oxygenation x 3 minutes  Glidescope  20mg Ketamine  PONV prophylaxis)  Induction: intravenous. MIPS: Postoperative opioids intended and Prophylactic antiemetics administered. Anesthetic plan and risks discussed with patient. Plan discussed with CRNA. Patient had chewing tobacco approx. 2 hours ago; therefore, given the increased risk for clinically significant aspiration, we will proceed under GA/OETT and modified RSI with HOB at 30 degrees RT.     Anni Schuster,    11/20/2022

## 2022-11-21 VITALS
HEART RATE: 77 BPM | SYSTOLIC BLOOD PRESSURE: 123 MMHG | DIASTOLIC BLOOD PRESSURE: 88 MMHG | RESPIRATION RATE: 16 BRPM | HEIGHT: 68 IN | WEIGHT: 205 LBS | TEMPERATURE: 99 F | BODY MASS INDEX: 31.07 KG/M2 | OXYGEN SATURATION: 99 %

## 2022-11-21 PROBLEM — K61.1 PERIRECTAL ABSCESS: Status: ACTIVE | Noted: 2022-11-21

## 2022-11-21 LAB
ALBUMIN SERPL-MCNC: 3.9 G/DL (ref 3.5–5.2)
ALP BLD-CCNC: 62 U/L (ref 40–129)
ALT SERPL-CCNC: 28 U/L (ref 0–40)
ANION GAP SERPL CALCULATED.3IONS-SCNC: 12 MMOL/L (ref 7–16)
AST SERPL-CCNC: 26 U/L (ref 0–39)
BASOPHILS ABSOLUTE: 0.02 E9/L (ref 0–0.2)
BASOPHILS RELATIVE PERCENT: 0.2 % (ref 0–2)
BILIRUB SERPL-MCNC: 0.9 MG/DL (ref 0–1.2)
BUN BLDV-MCNC: 7 MG/DL (ref 6–20)
CALCIUM SERPL-MCNC: 8.7 MG/DL (ref 8.6–10.2)
CHLORIDE BLD-SCNC: 97 MMOL/L (ref 98–107)
CO2: 23 MMOL/L (ref 22–29)
CREAT SERPL-MCNC: 0.7 MG/DL (ref 0.7–1.2)
EOSINOPHILS ABSOLUTE: 0 E9/L (ref 0.05–0.5)
EOSINOPHILS RELATIVE PERCENT: 0 % (ref 0–6)
GFR SERPL CREATININE-BSD FRML MDRD: >60 ML/MIN/1.73
GLUCOSE BLD-MCNC: 156 MG/DL (ref 74–99)
HBA1C MFR BLD: 4.9 % (ref 4–5.6)
HCT VFR BLD CALC: 39.3 % (ref 37–54)
HEMOGLOBIN: 13.4 G/DL (ref 12.5–16.5)
IMMATURE GRANULOCYTES #: 0.05 E9/L
IMMATURE GRANULOCYTES %: 0.5 % (ref 0–5)
LYMPHOCYTES ABSOLUTE: 0.55 E9/L (ref 1.5–4)
LYMPHOCYTES RELATIVE PERCENT: 5.5 % (ref 20–42)
MCH RBC QN AUTO: 35.3 PG (ref 26–35)
MCHC RBC AUTO-ENTMCNC: 34.1 % (ref 32–34.5)
MCV RBC AUTO: 103.4 FL (ref 80–99.9)
MONOCYTES ABSOLUTE: 0.33 E9/L (ref 0.1–0.95)
MONOCYTES RELATIVE PERCENT: 3.3 % (ref 2–12)
NEUTROPHILS ABSOLUTE: 9.11 E9/L (ref 1.8–7.3)
NEUTROPHILS RELATIVE PERCENT: 90.5 % (ref 43–80)
PDW BLD-RTO: 12.7 FL (ref 11.5–15)
PLATELET # BLD: 145 E9/L (ref 130–450)
PMV BLD AUTO: 9.5 FL (ref 7–12)
POTASSIUM SERPL-SCNC: 3.8 MMOL/L (ref 3.5–5)
RBC # BLD: 3.8 E12/L (ref 3.8–5.8)
SODIUM BLD-SCNC: 132 MMOL/L (ref 132–146)
TOTAL PROTEIN: 7.1 G/DL (ref 6.4–8.3)
WBC # BLD: 10.1 E9/L (ref 4.5–11.5)

## 2022-11-21 PROCEDURE — 85025 COMPLETE CBC W/AUTO DIFF WBC: CPT

## 2022-11-21 PROCEDURE — 99239 HOSP IP/OBS DSCHRG MGMT >30: CPT | Performed by: NURSE PRACTITIONER

## 2022-11-21 PROCEDURE — 36415 COLL VENOUS BLD VENIPUNCTURE: CPT

## 2022-11-21 PROCEDURE — 80074 ACUTE HEPATITIS PANEL: CPT

## 2022-11-21 PROCEDURE — 83036 HEMOGLOBIN GLYCOSYLATED A1C: CPT

## 2022-11-21 PROCEDURE — 80053 COMPREHEN METABOLIC PANEL: CPT

## 2022-11-21 PROCEDURE — 86703 HIV-1/HIV-2 1 RESULT ANTBDY: CPT

## 2022-11-21 PROCEDURE — 99239 HOSP IP/OBS DSCHRG MGMT >30: CPT | Performed by: INTERNAL MEDICINE

## 2022-11-21 PROCEDURE — 2580000003 HC RX 258: Performed by: SURGERY

## 2022-11-21 PROCEDURE — 2500000003 HC RX 250 WO HCPCS: Performed by: SURGERY

## 2022-11-21 PROCEDURE — 6360000002 HC RX W HCPCS: Performed by: SURGERY

## 2022-11-21 RX ORDER — METRONIDAZOLE 500 MG/1
500 TABLET ORAL 3 TIMES DAILY
Qty: 21 TABLET | Refills: 0 | Status: SHIPPED | OUTPATIENT
Start: 2022-11-21 | End: 2022-11-28

## 2022-11-21 RX ORDER — METRONIDAZOLE 500 MG/1
500 TABLET ORAL 3 TIMES DAILY
Qty: 9 TABLET | Refills: 0 | Status: SHIPPED | OUTPATIENT
Start: 2022-11-21 | End: 2022-11-21 | Stop reason: SDUPTHER

## 2022-11-21 RX ORDER — POLYETHYLENE GLYCOL 3350 17 G/17G
17 POWDER, FOR SOLUTION ORAL DAILY PRN
Qty: 527 G | Refills: 1 | Status: SHIPPED | OUTPATIENT
Start: 2022-11-21 | End: 2022-12-21

## 2022-11-21 RX ORDER — HYDROCODONE BITARTRATE AND ACETAMINOPHEN 5; 325 MG/1; MG/1
1 TABLET ORAL EVERY 8 HOURS PRN
Qty: 9 TABLET | Refills: 0 | Status: SHIPPED | OUTPATIENT
Start: 2022-11-21 | End: 2022-11-24

## 2022-11-21 RX ADMIN — Medication 10 ML: at 08:23

## 2022-11-21 RX ADMIN — METRONIDAZOLE 500 MG: 500 INJECTION, SOLUTION INTRAVENOUS at 06:43

## 2022-11-21 RX ADMIN — THIAMINE HYDROCHLORIDE 100 MG: 100 INJECTION, SOLUTION INTRAMUSCULAR; INTRAVENOUS at 08:02

## 2022-11-21 RX ADMIN — DEXTROSE AND SODIUM CHLORIDE: 5; 450 INJECTION, SOLUTION INTRAVENOUS at 10:14

## 2022-11-21 ASSESSMENT — PAIN SCALES - GENERAL
PAINLEVEL_OUTOF10: 0
PAINLEVEL_OUTOF10: 0

## 2022-11-21 NOTE — PROGRESS NOTES
GENERAL SURGERY  DAILY PROGRESS NOTE  11/21/2022    Chief Complaint   Patient presents with    Rectal Pain     X3 days Pain and swelling     Fever     Low grade       Subjective:  Postop day 1 from I&D with Penrose placement. Pain well controlled. Objective:  /67   Pulse 77   Temp 99.1 °F (37.3 °C) (Oral)   Resp 16   Ht 5' 8\" (1.727 m)   Wt 205 lb (93 kg)   SpO2 96%   BMI 31.17 kg/m²     GENERAL:  Laying in bed, awake, alert, cooperative, no apparent distress  LUNGS:  No increased work of breathing  CARDIOVASCULAR:  RR  ABDOMEN:  Soft, non-tender, non-distended  EXTREMITIES: No edema   SKIN: Perirectal abscess area with Penrose placement. Packing was removed.   Area remains tender to palpation    Assessment/Plan:  50 y.o. male perirectal abscess s/p I&D on 11/21 with Penrose placement    Continue antibiotics for a total of 7 days  Okay for discharge from general surgery point of view   Will have to maintain Penrose until follow-up   Continue wound care    Electronically signed by Tyrone Riley DO on 11/21/2022 at 7:29 AM

## 2022-11-21 NOTE — CARE COORDINATION
Transition of Care-POD#1-INCISION AND DRAINAGE PERIRECTAL ABSCESS WITH PENROSE LOOP DRAIN AND PACKING. Patient will be discharged today, met with him at bedside to discuss any potential needs. Public benefits screened this am-over income for medicaid or help with med's. No PCP-his retired, he will establish with a new PCP as soon as able, will follow up with Gen Surg after discharge.      Teena VARGASN, RN  [facility]

## 2022-11-21 NOTE — PROGRESS NOTES
REPORT CALLED TO FLOOR RN FOR ROOM 621 AT THIS TIME AND UPDATED ON PT STATUS TRANSPORT REQUEST PLACED

## 2022-11-21 NOTE — DISCHARGE INSTRUCTIONS
SURGERY DISCHARGE INSTRUCTIONS    You may be drowsy or lightheaded after receiving sedation or anesthesia. A responsible person should be with you for the next 24 hours. FOLLOW UP: Call office to schedule follow-up appointment in 2 weeks. DIET: Advance your diet as tolerated. Start with light diet and progress to your normal diet as you feel like eating. If you experience nausea or repeated episodes of vomiting which persist beyond 12-24 hours, notify your doctor. ACTIVITY: Rest today. Increase activity gradually. SHOWER/BATHING: Okay to shower. Recommend sitz baths to help keep area clean. WOUND CARE: You have a clean dressing applied. You may remove this dressing in 24 hours. You will need a new dressing to help control drainage. Avoid directly applying lotions, creams or oils to your incision. Keep incisions clean and dry. Always ensure you and your care giver clean hands before and after caring for the wound. You may place ice on incisions to decrease the pain and bruising. MEDICATIONS: Take as prescribed. Pain from the incision(s) is normal. The pain will vary from day to day and with any changes in your activity level, but it should gradually decrease over time. If you were given a prescription for a narcotic pain medication (percocet, norco, etc) you should NOT drink alcohol, drive, or operate any machinery. Do not take the narcotic medication if you are not having pain. If your pain is mild, you may take over-the-counter ibuprofen or tylenol for pain as directed, limit total amount of acetaminophen (tylenol) to 3 grams per 24-hour period and please note that NSAIDs (ibuprofen) can cause bleeding or stomach upset. You may experience constipation while taking pain medication, strongly recommended to take over-the-counter stool softeners (Docusate/Colace or Senokot-S) while using pain medications - use as directed on bottle. Okay to resume anticoagulant medication after 24hrs. DRAINS: Leave drains in until seen in office for follow-up. Place clean, dry dressing around drain site, change daily or as needed.        SPECIAL INSTRUCTIONS:   Call physician if you have any questions, to schedule a follow-up appointment, and if you notice any of the following:  Fever (temperature over 101ºF) or chills  Persistent nausea, vomiting, or bloating  Severe pain that is not relieved by the prescribed pain medication  Swelling or redness around your incision(s)  No bowel movement and feeling uncomfortable  Significant change in urination or no urine output for 8 hours  Excess bleeding (from the rectum or incision) - more than ½ cup that does not stop

## 2022-11-21 NOTE — ANESTHESIA POSTPROCEDURE EVALUATION
Department of Anesthesiology  Postprocedure Note    Patient: Yamilka Montgomery  MRN: [id number]  YOB: 1975  Date of evaluation: 11/20/2022      Procedure Summary     Date: 11/20/22 Room / Location: [facility] OR 01 / [facility]    Anesthesia Start: 4303 Anesthesia Stop:     Procedures:       INCISION AND DRAINAGE PERIRECTAL ABSCESS      Procedure Not Yet Scheduled Diagnosis:       Abscess      (Abscess [L02.91])    Surgeons: Feliciano Cabrera MD Responsible Provider: Anni Schuster DO    Anesthesia Type: general ASA Status: 2 - Emergent          Anesthesia Type: No value filed. Freida Phase I:      Freida Phase II:        Anesthesia Post Evaluation    Patient location during evaluation: PACU  Patient participation: complete - patient participated  Level of consciousness: awake  Pain score: 2  Airway patency: patent  Nausea & Vomiting: no nausea and no vomiting  Complications: no  Cardiovascular status: hemodynamically stable  Respiratory status: acceptable  Hydration status: euvolemic  Comments: Seen and examined. Progressing well. No questions at this time.   Multimodal analgesia pain management approach

## 2022-11-21 NOTE — DISCHARGE SUMMARY
wheezes, rales or rhonchi, normal air movement, no respiratory distress  Cardiovascular: normal rate, normal S1 and S2 and no carotid bruits  Abdomen: soft, non-tender, non-distended, normal bowel sounds, no masses or organomegaly  Extremities: no cyanosis, no clubbing and no edema  Neurologic: no cranial nerve deficit and speech normal    I/O last 3 completed shifts: In: 3674 [I.V.:1275]  Out: -   No intake/output data recorded. LABS:  Recent Labs     11/20/22  1315 11/21/22  0211    132   K 4.0 3.8   CL 97* 97*   CO2 23 23   BUN 6 7   CREATININE 0.7 0.7   GLUCOSE 98 156*   CALCIUM 10.0 8.7       Recent Labs     11/20/22  1315 11/21/22  0211   WBC 10.1 10.1   RBC 4.35 3.80   HGB 15.1 13.4   HCT 44.7 39.3   .8* 103.4*   MCH 34.7 35.3*   MCHC 33.8 34.1   RDW 12.6 12.7    145   MPV 9.4 9.5       No results for input(s): POCGLU in the last 72 hours. Imaging:  CT ABDOMEN PELVIS W IV CONTRAST Additional Contrast? None    Result Date: 11/20/2022  EXAMINATION: CT OF THE ABDOMEN AND PELVIS WITH CONTRAST 11/20/2022 2:26 pm TECHNIQUE: CT of the abdomen and pelvis was performed with the administration of intravenous contrast. Multiplanar reformatted images are provided for review. Automated exposure control, iterative reconstruction, and/or weight based adjustment of the mA/kV was utilized to reduce the radiation dose to as low as reasonably achievable. COMPARISON: None. HISTORY: ORDERING SYSTEM PROVIDED HISTORY: concern for abscess in the perianal region TECHNOLOGIST PROVIDED HISTORY: Additional Contrast?->None Reason for exam:->concern for abscess in the perianal region Decision Support Exception - unselect if not a suspected or confirmed emergency medical condition->Emergency Medical Condition (MA) FINDINGS: Lower Chest: No pleural or pericardial effusions are seen. Organs: No focal liver masses are seen. No splenic masses are identified. No peripancreatic fluid is noted.   Diminished attenuation of the liver relative to the spleen is noted which could reflect fatty infiltration or other parenchymal process within the liver. The portal venous system is patent. The adrenal glands are negative. Kidneys enhance as expected. The aorta is normal caliber. There is no evidence for abscess or free air. The large and small bowel caliber is within normal limits. A paraumbilical hernia containing fat only is noted. The fascial defect measures 1.6 mm. No bladder calculi are seen. Vascular phleboliths are noted within the pelvis. Induration of fat in the perianal region is noted on image 211 measuring 8 x 2.8 mm. Finding may reflect phlegmonous inflammatory change in the perirectal region in the absence of a known history of neoplastic disease. 8 x 2.8 mm left Sharon rectal phlegmon with induration of the adjacent fat compatible with cellulitis. Patient Instructions:   Yelena Wu report reviewed     Medication List        START taking these medications      HYDROcodone-acetaminophen 5-325 MG per tablet  Commonly known as: Norco  Take 1 tablet by mouth every 8 hours as needed for Pain for up to 3 days. Intended supply: 5 days. Take lowest dose possible to manage pain     metroNIDAZOLE 500 MG tablet  Commonly known as: FLAGYL  Take 1 tablet by mouth 3 times daily for 7 days     polyethylene glycol 17 g packet  Commonly known as: GLYCOLAX  Take 17 g by mouth daily as needed for Constipation               Where to Get Your Medications        These medications were sent to [facility]/pharm[place] - [address] [facility] [place] [phone] Josiah Gilford [phone]  [address] [place] [address] [place] [address]      Phone: [phone]   HYDROcodone-acetaminophen 5-325 MG per tablet  metroNIDAZOLE 500 MG tablet  polyethylene glycol 17 g packet         In Review of EMR,  evaluation, management and diagnosis. Discharge plan has been discussed with attending.  Time spent 45  minutes Signed:  Electronically signed by PATRICE Martinez CNP on 11/21/2022 at 12:19 PM  HOSPITALIST ATTENDING PHYSICIAN NOTE 11/21/2022 1655PM:    Details of the evaluation - subjective assessment (including medication profile, past medical, family and social history when applicable), examination, review of lab and test data, diagnostic impressions and medical decision making - performed by PATRICE Martinez CNP, were discussed with me on the date of service and I agree with clinical information herein unless otherwise noted. The patient has been evaluated by me personally earlier today. Pt reports no fevers, chills,n/v.     Exam: heart reg at rate of 80, lungs cta, abd pos bs soft nt, ext neg for le edema    I agree with the assessment and plan of PATRICE Martinez CNP    Phlegmon/perirectal abscess  Elevated lactic acid level  Alcohol use  Elevated bp without dx of htn    Total time for discharge is 37 min    Electronically signed by Chivo Alvarez D.O.   Hospitalist  [facility]ist [facility] at [facility]

## 2022-11-21 NOTE — CONSULTS
General Surgery Consult    Patient's Name/Date of Birth: Dejuan Stuart / 1975    Date: November 20, 2022     Consulting Surgeon: Royal Brunner M.D.    PCP: No primary care provider on file. Chief Complaint: Perirectal abscess    HPI:   Dejuan Stuart is a 50 y.o. male who presents for evaluation of perirectal abscess. Patient states that on Thursday he began to have rectal pain. He thought it was a hemorrhoid and tried using hemorrhoid creams and wipes. This pain continued to worsen. He began to have fevers yesterday. He presented today for further evaluation. No chest pain, shortness of breath, cough, nausea, vomiting. History reviewed. No pertinent past medical history. No past surgical history on file.     Current Facility-Administered Medications   Medication Dose Route Frequency Provider Last Rate Last Admin    metronidazole (FLAGYL) 500 mg in 0.9% NaCl 100 mL IVPB premix  500 mg IntraVENous Q8H Dorita Bears, DO   Stopped at 11/20/22 1638    sodium chloride flush 0.9 % injection 5-40 mL  5-40 mL IntraVENous 2 times per day [place], APRN - NP        sodium chloride flush 0.9 % injection 5-40 mL  5-40 mL IntraVENous PRN [place], APRN - NP   10 mL at 11/20/22 1738    0.9 % sodium chloride infusion   IntraVENous PRN Gabe [place], APRN - NP        enoxaparin (LOVENOX) injection 40 mg  40 mg SubCUTAneous Daily Gabe [place], APRN - NP        ondansetron (ZOFRAN-ODT) disintegrating tablet 4 mg  4 mg Oral Q8H PRN Gabe [place], APRN - NP        Or    ondansetron [facility]) injection 4 mg  4 mg IntraVENous Q6H PRN Gabe [place], APRN - NP        polyethylene glycol (GLYCOLAX) packet 17 g  17 g Oral Daily PRN Gabe [place], APRN - NP        acetaminophen (TYLENOL) tablet 650 mg  650 mg Oral Q6H PRN [place], APRN - NP        Or    acetaminophen (TYLENOL) suppository 650 mg  650 mg Rectal Q6H PRN [place], APRN - NP        hydrALAZINE (APRESOLINE) injection 5 mg  5 mg IntraVENous Q6H PRN Riesa Gerold, APRN - NP        ketorolac (TORADOL) injection 15 mg  15 mg IntraVENous Q6H PRN Riesa Gerold, APRN - NP        diphenhydrAMINE (BENADRYL) tablet 25 mg  25 mg Oral Nightly PRN Orlando Hric, DO        dextrose 5 % and 0.45 % sodium chloride infusion   IntraVENous Continuous Orlando Hric,  mL/hr at 11/20/22 1741 New Bag at 11/20/22 1741    thiamine (B-1) injection 100 mg  100 mg IntraVENous Daily Orlando Hric, DO   100 mg at 11/20/22 1738     Facility-Administered Medications Ordered in Other Encounters   Medication Dose Route Frequency Provider Last Rate Last Admin    lactated ringers infusion   IntraVENous Continuous PRN Margrette Sons, APRN - CRNA   New Bag at 11/20/22 1841       No Known Allergies    No family history on file.     Social History     Socioeconomic History    Marital status: Single     Spouse name: Not on file    Number of children: Not on file    Years of education: Not on file    Highest education level: Not on file   Occupational History    Not on file   Tobacco Use    Smoking status: Not on file    Smokeless tobacco: Not on file   Substance and Sexual Activity    Alcohol use: Not on file    Drug use: Not on file    Sexual activity: Not on file   Other Topics Concern    Not on file   Social History Narrative    Not on file     Social Determinants of Health     Financial Resource Strain: Not on file   Food Insecurity: Not on file   Transportation Needs: Not on file   Physical Activity: Not on file   Stress: Not on file   Social Connections: Not on file   Intimate Partner Violence: Not on file   Housing Stability: Not on file           Review of Systems  Negative times ten except what was stated in HPI and PMH    Physical exam:   /82   Pulse 95   Temp 98.3 °F (36.8 °C) (Oral)   Resp 18   Ht 5' 8\" (1.727 m)   Wt 205 lb (93 kg)   SpO2 98%   BMI 31.17 kg/m²   General appearance: AAOx3, NAD  Head: NCAT, PERRLA, EOMI, red conjunctiva  Neck: supple, no masses  Lungs: CTAB, equal chest rise bilateral  Heart: Reg rate  Abdomen: soft, nontender, no guarding/rebound, nondistended, no palpable hernias or defects  Skin: no lesions  Gu: no cva tenderness  Extremities: extremities normal, atraumatic, no cyanosis or edema  Rectal exam: Erythema and induration left perirectal area with tenderness palpation    Labs:   Latest Reference Range & Units 11/20/22 13:15   Sodium 132 - 146 mmol/L 136   Potassium 3.5 - 5.0 mmol/L 4.0   Chloride 98 - 107 mmol/L 97 (L)   CO2 22 - 29 mmol/L 23   BUN,BUNPL 6 - 20 mg/dL 6   Creatinine 0.7 - 1.2 mg/dL 0.7   Anion Gap 7 - 16 mmol/L 16   Est, Glom Filt Rate >=60 mL/min/1.73 >60   Lactic Acid, Sepsis 0.5 - 1.9 mmol/L 2.1 (H)   Glucose, Random 74 - 99 mg/dL 98   CALCIUM, SERUM, 061582 8.6 - 10.2 mg/dL 10.0   Total Protein 6.4 - 8.3 g/dL 8.0   Albumin 3.5 - 5.2 g/dL 4.6   Alk Phos 40 - 129 U/L 77   ALT 0 - 40 U/L 37   AST 0 - 39 U/L 32   Bilirubin 0.0 - 1.2 mg/dL 1.1   WBC 4.5 - 11.5 E9/L 10.1   RBC 3.80 - 5.80 E12/L 4.35   Hemoglobin Quant 12.5 - 16.5 g/dL 15.1   Hematocrit 37.0 - 54.0 % 44.7   MCV 80.0 - 99.9 fL 102.8 (H)   MCH 26.0 - 35.0 pg 34.7   MCHC 32.0 - 34.5 % 33.8   MPV 7.0 - 12.0 fL 9.4   RDW 11.5 - 15.0 fL 12.6   Platelet Count 185 - 450 E9/L 143   Neutrophils % 43.0 - 80.0 % 78.4   Immature Granulocytes % 0.0 - 5.0 % 0.4   Lymphocyte % 20.0 - 42.0 % 13.0 (L)   Monocytes % 2.0 - 12.0 % 7.3   Eosinophils % 0.0 - 6.0 % 0.2   Basophils % 0.0 - 2.0 % 0.7   Neutrophils Absolute 1.80 - 7.30 E9/L 7.90 (H)   Immature Granulocytes # E9/L 0.04   Lymphocytes Absolute 1.50 - 4.00 E9/L 1.31 (L)   Monocytes Absolute 0.10 - 0.95 E9/L 0.74   Eosinophils Absolute 0.05 - 0.50 E9/L 0.02 (L)   Basophils Absolute 0.00 - 0.20 E9/L 0.07   Prothrombin Time 9.3 - 12.4 sec 11.7   INR  1.1   (L): Data is abnormally low  (H): Data is abnormally high    Radiology:  CT abdomen/pelvis:   Impression   8 x 2.8 mm left Sharon rectal phlegmon with induration of the adjacent fat   compatible with cellulitis. Assessment:  50 y.o. male with perirectal abscess    Plan:  I&D in OR. Risks, benefits, alteratives (and risks of alternatives) of the procedure were discussed with patient at bedside, all questions answered. He understands and agrees to proceed.        Jona Stratton MD  11/20/2022  7:01 PM

## 2022-11-21 NOTE — PROGRESS NOTES
PT RECEIVED IN PACU FROM SURGERY REPORT RECEIVED ASSESSMENT AND VS OBTAINED PT AWAKE AND TALKING WAS JUST MEDICATED FOR PAIN PER CRNA PRIOR TO LEAVING OR ROOM , PT STATES COMFORTABLE AT THIS TIME. 2000 DR VIZCARRA HERE AT BEDSIDE  AND UPDATED PT ON SURGERY AND PLAN OF CARE AT THIS TIME .

## 2022-11-21 NOTE — PROGRESS NOTES
[facility] Progress Note    Admitting Date and Time: 11/20/2022 11:58 AM  Admit Dx: Phlegmon [L02.91]  Abscess [L02.91]  Septicemia ([address] [A41.9]    Subjective:  Patient is being followed for Phlegmon [L02.91]  Abscess [L02.91]  Septicemia ([address] [A41.9]   Pt feels ***  Per RN: ***    ROS: denies fever, chills, cp, sob, n/v, HA unless stated above.  ***     metroNIDAZOLE  500 mg IntraVENous Q8H    sodium chloride flush  5-40 mL IntraVENous 2 times per day    enoxaparin  40 mg SubCUTAneous Daily    thiamine  100 mg IntraVENous Daily     sodium chloride flush, 5-40 mL, PRN  sodium chloride, , PRN  ondansetron, 4 mg, Q8H PRN   Or  ondansetron, 4 mg, Q6H PRN  polyethylene glycol, 17 g, Daily PRN  acetaminophen, 650 mg, Q6H PRN   Or  acetaminophen, 650 mg, Q6H PRN  hydrALAZINE, 5 mg, Q6H PRN  ketorolac, 15 mg, Q6H PRN  diphenhydrAMINE, 25 mg, Nightly PRN         Objective:    /88   Pulse 77   Temp 99 °F (37.2 °C) (Oral)   Resp 16   Ht 5' 8\" (1.727 m)   Wt 205 lb (93 kg)   SpO2 99%   BMI 31.17 kg/m²   ***  General Appearance: alert and oriented to person, place and time and in no acute distress  Skin: warm and dry  Head: normocephalic and atraumatic  Eyes: pupils equal, round, and reactive to light, extraocular eye movements intact, conjunctivae normal  Neck: neck supple and non tender without mass   Pulmonary/Chest: clear to auscultation bilaterally- no wheezes, rales or rhonchi, normal air movement, no respiratory distress  Cardiovascular: normal rate, normal S1 and S2 and no carotid bruits  Abdomen: soft, non-tender, non-distended, normal bowel sounds, no masses or organomegaly  Extremities: no cyanosis, no clubbing and no edema  Neurologic: no cranial nerve deficit and speech normal        Recent Labs     11/20/22  1315 11/21/22  0211    132   K 4.0 3.8   CL 97* 97*   CO2 23 23   BUN 6 7   CREATININE 0.7 0.7   GLUCOSE 98 156*   CALCIUM 10.0 8.7       Recent Labs     11/20/22  1315

## 2022-11-21 NOTE — OP NOTE
Operative Note      Patient: Ben Gay  YOB: 1975  MRN: [id number]    Date of Procedure: 11/20/2022    Pre-Op Diagnosis: Left perirectal abscess    Post-Op Diagnosis: same       Procedure(s):  INCISION AND DRAINAGE PERIRECTAL ABSCESS WITH PENROSE LOOP DRAIN AND PACKING    Surgeon(s):  Umm Saavedra MD    Assistant:   * No surgical staff found *    Anesthesia: General    Estimated Blood Loss (mL): 10    Complications: None    Specimens:   ID Type Source Tests Collected by Time Destination   1 : PERIRECTAL ABSCESS CULTURE Tissue Tissue CULTURE, ANAEROBIC, CULTURE, FUNGUS, GRAM STAIN, CULTURE, SURGICAL, CULTURE WITH SMEAR, ACID FAST [address] [address] ANDERSON MD 11/20/2022 1919        Implants:  * No implants in log *      Drains:   Open Drain 11/20/22 Medial Perineal (Active)       Indications:    50year old male with history, physical, laboratory and imaging findings consistent with left perirectal abscess. Incision and drainage was recommended. Risks, benefits, alternatives (and risks of alternatives) of surgery were discussed with the patient, which include but are not limited to, bleeding, infection, sphincter injury, risk of further debridement, risk of cosmetic deformity, nerve injury, risk of anesthesia, blood clots, pneumonia, heart attack and stroke. Patient understands these risk and agrees to proceed. Details of Procedure:    Patient was taken to the operating room and placed in the lithotomy position. Anesthesia was induced by anesthesia team.  Perirectal area were prepped and draped in the sterile fashion. 1% lidocaine with epinephrine was instilled at the left perirectal skin at the area of induration. A 3 cm incision was made at the area of induration in the left perirectal skin. A Fatemeh clamp was used to break up loculations and a moderate amount purulence was expressed and sent for culture. This area was then explored with my finger and was found to posteriorly.   A 2 cm counterincision was made posteriorly. Next the wound was irrigated with sterile saline until the irrigation being suction was clear. Next a Penrose loop drain was placed and was secured with a 3-0 nylon suture. Hemostasis was checked and was excellent. Both wound openings were then packed with 1 inch iodoform packing and a sterile dressing was applied. All sponge, needle and instrumentation counts were correct at the end of the procedure. Patient tolerated the procedure well and was taken to PACU in stable condition.     Electronically signed by Gabriella Dugan MD on 11/20/2022 at 7:42 PM

## 2022-11-22 LAB
GRAM STAIN ORDERABLE: NORMAL
HAV IGM SER IA-ACNC: NORMAL
HEPATITIS B CORE IGM ANTIBODY: NORMAL
HEPATITIS B SURFACE ANTIGEN INTERPRETATION: NORMAL
HEPATITIS C ANTIBODY INTERPRETATION: NORMAL
HIV-1 AND HIV-2 ANTIBODIES: NORMAL

## 2022-11-23 LAB
AFB SMEAR: NORMAL
URINE CULTURE, ROUTINE: NORMAL

## 2022-11-24 LAB
ANAEROBIC CULTURE: NORMAL
CULTURE SURGICAL: ABNORMAL
FUNGUS STAIN: NORMAL
ORGANISM: ABNORMAL

## 2022-11-25 LAB
BLOOD CULTURE, ROUTINE: NORMAL
CULTURE, BLOOD 2: NORMAL

## 2024-02-01 ENCOUNTER — HOSPITAL ENCOUNTER (OUTPATIENT)
Age: 49
Discharge: HOME | End: 2024-02-03

## 2024-02-05 LAB — SURGICAL PATHOLOGY REPORT: NORMAL

## 2024-12-10 ENCOUNTER — OFFICE VISIT (OUTPATIENT)
Dept: FAMILY MEDICINE CLINIC | Age: 49
End: 2024-12-10
Payer: COMMERCIAL

## 2024-12-10 VITALS
WEIGHT: 210 LBS | RESPIRATION RATE: 18 BRPM | OXYGEN SATURATION: 97 % | TEMPERATURE: 98.3 F | SYSTOLIC BLOOD PRESSURE: 120 MMHG | HEIGHT: 68 IN | HEART RATE: 116 BPM | BODY MASS INDEX: 31.83 KG/M2 | DIASTOLIC BLOOD PRESSURE: 80 MMHG

## 2024-12-10 DIAGNOSIS — M10.9 EXACERBATION OF GOUT: Primary | ICD-10-CM

## 2024-12-10 PROCEDURE — 99214 OFFICE O/P EST MOD 30 MIN: CPT | Performed by: NURSE PRACTITIONER

## 2024-12-10 RX ORDER — COLCHICINE 0.6 MG/1
CAPSULE ORAL
Qty: 3 CAPSULE | Refills: 0 | Status: SHIPPED | OUTPATIENT
Start: 2024-12-10

## 2024-12-10 RX ORDER — METHYLPREDNISOLONE 4 MG/1
TABLET ORAL
Qty: 1 KIT | Refills: 0 | Status: SHIPPED | OUTPATIENT
Start: 2024-12-10

## 2024-12-10 RX ORDER — COLCHICINE 0.6 MG/1
TABLET ORAL
COMMUNITY
End: 2024-12-10

## 2024-12-10 NOTE — PROGRESS NOTES
12/10/24  Keith Anderson : 1975 Sex: male  Age 49 y.o.    Subjective:  Chief Complaint   Patient presents with    Gout     Right foot flare on Friday. Not taking any medication for gout.       HPI:   Keith Anderson , 49 y.o. male presents to the clinic for evaluation of right foot pain x 4 days. The patient has taken ibuprofen for symptoms. The patient reports unchanged symptoms over time. The patient reports a history of gout with similar symptoms. The patient denies injury, headache, fever, chest pain, abdominal pain, shortness of breath, and nausea / vomiting / diarrhea.    ROS:   Unless otherwise stated in this report the patient's positive and negative responses for review of systems for constitutional, eyes, ENT, cardiovascular, respiratory, gastrointestinal, neurological, , musculoskeletal, and integument systems and related systems to the presenting problem are either stated in the history of present illness or were not pertinent or were negative for the symptoms and/or complaints related to the presenting medical problem.  Positives and pertinent negatives as per HPI.  All others reviewed and are negative.      PMH:     Past Medical History:   Diagnosis Date    Gout        Past Surgical History:   Procedure Laterality Date    RECTAL SURGERY N/A 2022    INCISION AND DRAINAGE PERIRECTAL ABSCESS performed by Tej ANDERSON MD at [facility] OR       History reviewed. No pertinent family history.    Medications:     Current Outpatient Medications:     methylPREDNISolone (MEDROL DOSEPACK) 4 MG tablet, Take by mouth., Disp: 1 kit, Rfl: 0    colchicine (MITIGARE) 0.6 MG capsule, Take 2 tablets, followed by 1 tablet one hour later by mouth., Disp: 3 capsule, Rfl: 0    Allergies:     Allergies   Allergen Reactions    Seasonal        Social History:     Social History     Tobacco Use    Smoking status: Never    Smokeless tobacco: Never       Physical Exam:     Vitals:    12/10/24 1119   BP: 120/80

## 2025-01-15 ENCOUNTER — HOSPITAL ENCOUNTER (EMERGENCY)
Age: 50
Discharge: HOME | End: 2025-01-15
Payer: COMMERCIAL

## 2025-01-15 ENCOUNTER — APPOINTMENT (OUTPATIENT)
Dept: GENERAL RADIOLOGY | Age: 50
End: 2025-01-15
Payer: COMMERCIAL

## 2025-01-15 VITALS
HEIGHT: 68 IN | DIASTOLIC BLOOD PRESSURE: 90 MMHG | HEART RATE: 95 BPM | BODY MASS INDEX: 32.13 KG/M2 | WEIGHT: 212 LBS | SYSTOLIC BLOOD PRESSURE: 146 MMHG | OXYGEN SATURATION: 99 % | RESPIRATION RATE: 18 BRPM | TEMPERATURE: 97.7 F

## 2025-01-15 DIAGNOSIS — M19.071 OSTEOARTHRITIS OF RIGHT FOOT, UNSPECIFIED OSTEOARTHRITIS TYPE: ICD-10-CM

## 2025-01-15 DIAGNOSIS — M79.673 PAIN OF FOOT, UNSPECIFIED LATERALITY: ICD-10-CM

## 2025-01-15 DIAGNOSIS — S92.314A CLOSED NONDISPLACED FRACTURE OF FIRST METATARSAL BONE OF RIGHT FOOT, INITIAL ENCOUNTER: Primary | ICD-10-CM

## 2025-01-15 LAB
ANION GAP SERPL CALCULATED.3IONS-SCNC: 14 MMOL/L (ref 7–16)
BASOPHILS # BLD: 0.07 K/UL (ref 0–0.2)
BASOPHILS NFR BLD: 1 % (ref 0–2)
BUN SERPL-MCNC: 6 MG/DL (ref 6–20)
CALCIUM SERPL-MCNC: 9.9 MG/DL (ref 8.6–10.2)
CHLORIDE SERPL-SCNC: 98 MMOL/L (ref 98–107)
CO2 SERPL-SCNC: 25 MMOL/L (ref 22–29)
CREAT SERPL-MCNC: 0.6 MG/DL (ref 0.7–1.2)
EOSINOPHIL # BLD: 0.03 K/UL (ref 0.05–0.5)
EOSINOPHILS RELATIVE PERCENT: 0 % (ref 0–6)
ERYTHROCYTE [DISTWIDTH] IN BLOOD BY AUTOMATED COUNT: 12 % (ref 11.5–15)
GFR, ESTIMATED: >90 ML/MIN/1.73M2
GLUCOSE SERPL-MCNC: 102 MG/DL (ref 74–99)
HCT VFR BLD AUTO: 42.5 % (ref 37–54)
HGB BLD-MCNC: 14 G/DL (ref 12.5–16.5)
IMM GRANULOCYTES # BLD AUTO: 0.03 K/UL (ref 0–0.58)
IMM GRANULOCYTES NFR BLD: 0 % (ref 0–5)
LYMPHOCYTES NFR BLD: 1.44 K/UL (ref 1.5–4)
LYMPHOCYTES RELATIVE PERCENT: 21 % (ref 20–42)
MCH RBC QN AUTO: 34 PG (ref 26–35)
MCHC RBC AUTO-ENTMCNC: 32.9 G/DL (ref 32–34.5)
MCV RBC AUTO: 103.2 FL (ref 80–99.9)
MONOCYTES NFR BLD: 0.72 K/UL (ref 0.1–0.95)
MONOCYTES NFR BLD: 11 % (ref 2–12)
NEUTROPHILS NFR BLD: 66 % (ref 43–80)
NEUTS SEG NFR BLD: 4.51 K/UL (ref 1.8–7.3)
PLATELET # BLD AUTO: 156 K/UL (ref 130–450)
PMV BLD AUTO: 9.6 FL (ref 7–12)
POTASSIUM SERPL-SCNC: 3.8 MMOL/L (ref 3.5–5)
RBC # BLD AUTO: 4.12 M/UL (ref 3.8–5.8)
SODIUM SERPL-SCNC: 137 MMOL/L (ref 132–146)
URATE SERPL-MCNC: 3.6 MG/DL (ref 3.4–7)
WBC OTHER # BLD: 6.8 K/UL (ref 4.5–11.5)

## 2025-01-15 PROCEDURE — 6370000000 HC RX 637 (ALT 250 FOR IP): Performed by: NURSE PRACTITIONER

## 2025-01-15 PROCEDURE — 73630 X-RAY EXAM OF FOOT: CPT

## 2025-01-15 PROCEDURE — 85025 COMPLETE CBC W/AUTO DIFF WBC: CPT

## 2025-01-15 PROCEDURE — 84550 ASSAY OF BLOOD/URIC ACID: CPT

## 2025-01-15 PROCEDURE — 80048 BASIC METABOLIC PNL TOTAL CA: CPT

## 2025-01-15 PROCEDURE — 99284 EMERGENCY DEPT VISIT MOD MDM: CPT

## 2025-01-15 RX ORDER — NAPROXEN 500 MG/1
500 TABLET ORAL 2 TIMES DAILY PRN
Qty: 14 TABLET | Refills: 0 | Status: SHIPPED | OUTPATIENT
Start: 2025-01-15 | End: 2025-01-22

## 2025-01-15 RX ORDER — HYDROCODONE BITARTRATE AND ACETAMINOPHEN 5; 325 MG/1; MG/1
1 TABLET ORAL EVERY 8 HOURS PRN
Qty: 9 TABLET | Refills: 0 | Status: SHIPPED | OUTPATIENT
Start: 2025-01-15 | End: 2025-01-18

## 2025-01-15 RX ORDER — HYDROCODONE BITARTRATE AND ACETAMINOPHEN 5; 325 MG/1; MG/1
1 TABLET ORAL ONCE
Status: COMPLETED | OUTPATIENT
Start: 2025-01-15 | End: 2025-01-15

## 2025-01-15 RX ADMIN — HYDROCODONE BITARTRATE AND ACETAMINOPHEN 1 TABLET: 5; 325 TABLET ORAL at 15:22

## 2025-01-15 ASSESSMENT — PAIN DESCRIPTION - LOCATION
LOCATION: FOOT
LOCATION: FOOT

## 2025-01-15 ASSESSMENT — PAIN DESCRIPTION - DESCRIPTORS
DESCRIPTORS: SHARP;THROBBING
DESCRIPTORS: ACHING

## 2025-01-15 ASSESSMENT — PAIN - FUNCTIONAL ASSESSMENT
PAIN_FUNCTIONAL_ASSESSMENT: PREVENTS OR INTERFERES SOME ACTIVE ACTIVITIES AND ADLS
PAIN_FUNCTIONAL_ASSESSMENT: 0-10

## 2025-01-15 ASSESSMENT — LIFESTYLE VARIABLES
HOW OFTEN DO YOU HAVE A DRINK CONTAINING ALCOHOL: 4 OR MORE TIMES A WEEK
HOW MANY STANDARD DRINKS CONTAINING ALCOHOL DO YOU HAVE ON A TYPICAL DAY: 1 OR 2

## 2025-01-15 ASSESSMENT — PAIN SCALES - GENERAL
PAINLEVEL_OUTOF10: 8
PAINLEVEL_OUTOF10: 9

## 2025-01-15 ASSESSMENT — PAIN DESCRIPTION - ORIENTATION
ORIENTATION: RIGHT;LEFT
ORIENTATION: RIGHT

## 2025-01-15 ASSESSMENT — PAIN DESCRIPTION - ONSET: ONSET: ON-GOING

## 2025-01-15 ASSESSMENT — PAIN DESCRIPTION - FREQUENCY: FREQUENCY: CONTINUOUS

## 2025-01-15 ASSESSMENT — PAIN DESCRIPTION - PAIN TYPE: TYPE: ACUTE PAIN

## 2025-01-15 NOTE — ED PROVIDER NOTES
Independent MELCHOR Visit.          [facility] EMERGENCY DEPARTMENT  ED  Encounter Note  Admit Date/RoomTime: 1/15/2025 12:36 PM  ED Room: DISPO/D02  NAME: Keith Anderson  : 1975  MRN: [id number]  PCP: No primary care provider on file.    CHIEF COMPLAINT     Foot Pain (Bilateral - hx of gout - has went to urgent cares and taken steroids and colchicine without relief)    HISTORY OF PRESENT ILLNESS        Keith Anderson is a 49 y.o. male who presents to the ED by private vehicle for right foot pain and had pain in the left foot yesterday and reports that he has gone to urgent care 2 times this month he has been on 2 separate episodes and was prescribed indomethacin, steroids and colchicine for gout with no relief of the right foot pain.  He reports that the left foot does not hurt today it has resolved.  He states that he is cut out all the meat in his diet and does admit to drinking 2 shots of whiskey a day when he gets home from work.  He did take Motrin prior to arrival because he is out of his indomethacin and is prescribed allopurinol. The complaint has been persistent and gradually worsening and are moderate in severity.  He denies any fevers, chills or any history of diabetes.    REVIEW OF SYSTEMS     Pertinent positives and negatives are stated within HPI, all other systems reviewed and are negative.    Past Medical History:  has a past medical history of Gout.  Surgical History:  has a past surgical history that includes Rectal surgery (N/A, 2022).  Social History:  reports that he has never smoked. He has never used smokeless tobacco.  Family History: family history is not on file.   Allergies: Seasonal  CURRENT MEDICATIONS       Discharge Medication List as of 1/15/2025  3:30 PM        CONTINUE these medications which have NOT CHANGED    Details   methylPREDNISolone (MEDROL DOSEPACK) 4 MG tablet Take by mouth., Disp-1 kit, R-0Normal      colchicine (MITIGARE) 0.6 MG capsule    Weight: 96.2 kg (212 lb)   Height: 1.727 m (5' 8\")            PROCEDURES     PROCEDURE NOTE  1/15/25       Time: 1536    SPLINT  APPLICATION  Risks, benefits and alternatives (for applicable procedures below) described.   Performed By: Nimco ZUNIAG and supervised by PATRICE Zelaya - CNP.    Indication:  fracture of right first metatarsal.  Procedure:   A short right walking boot splint was applied by the RN.   The patient tolerated the procedure well.  Neurovascular status remains intact pre and post splint application with capillary refill less than 3 seconds in distal toes and full sensation intact to light touch in distal toes.          REASSESSMENT   1/15/25       Time: 1525  Reviewed results with patient that he most likely has a nondisplaced fracture versus arthritis and his uric acid levels are normal.  He reports he is out of the indomethacin and will give naproxen for home.  He would like something further for pain I did not feel comfortable giving him another course of steroids which he felt the steroids made his pain go away completely for couple days and then it came back.  Will give referral to podiatry and have him follow-up.  He will be placed in a boot and given a work excuse.  Patient advised on precautions while taking opioids not to drink alcohol drive or operate heavy equipment.  He was also advised on a low uric acid diet since he has a history of gout.    CONSULTS:  None      MDM: Keith Anderson is a 49 y.o. male who presents to the ED by private vehicle for right foot pain and had pain in the left foot yesterday and reports that he has gone to urgent care 2 times this month he has been on 2 separate episodes and was prescribed indomethacin, steroids and colchicine for gout with no relief of the right foot pain.  He reports that the left foot does not hurt today it has resolved.  He states that he is cut out all the meat in his diet and does admit to drinking 2 shots of whiskey a day when

## 2025-01-15 NOTE — DISCHARGE INSTR - COC
ADLs:120040567}  Med Admin  {[facility] DME ADLs:327327480}  Med Delivery   { [facility] MED Delivery:597181711}    Wound Care Documentation and Therapy:  Incision 22 Perineum (Active)   Number of days: 787        Elimination:  Continence:   Bowel: {YES / NO:}  Bladder: {YES / NO:}  Urinary Catheter: {Urinary Catheter:655426338}   Colostomy/Ileostomy/Ileal Conduit: {YES / NO:}       Date of Last BM: ***  No intake or output data in the 24 hours ending 01/15/25 1535  No intake/output data recorded.    Safety Concerns:     { EMILY Safety Concerns:635951910}    Impairments/Disabilities:      { EMILY Impairments/Disabilities:741184868}    Nutrition Therapy:  Current Nutrition Therapy:   { [facility] Diet List:470403789}    Routes of Feeding: {[facility] DME Other Feedings:663187050}  Liquids: {Slp liquid thickness:33369}  Daily Fluid Restriction: {[facility] DME Yes amt example:520953885}  Last Modified Barium Swallow with Video (Video Swallowing Test): {Done Not Done Date:543190919}    Treatments at the Time of Hospital Discharge:   Respiratory Treatments: ***  Oxygen Therapy:  {Therapy; copd oxygen:88085}  Ventilator:    {[facility] Vent List:240970921}    Rehab Therapies: {THERAPEUTIC INTERVENTION:6983360310}  Weight Bearing Status/Restrictions: {[facility] Weight Bearin}  Other Medical Equipment (for information only, NOT a DME order):  {EQUIPMENT:383537388}  Other Treatments: ***    Patient's personal belongings (please select all that are sent with patient):  {[facility] DME Belongings:303386278}    RN SIGNATURE:  {Esignature:549250418}    CASE MANAGEMENT/SOCIAL WORK SECTION    Inpatient Status Date: ***    Readmission Risk Assessment Score:  [facility] RISK OF UNPLANNED READMISSION 2.0             0 Total Score        Discharging to Facility/ Agency   Name:   Address:  Phone:  Fax:    Dialysis Facility (if applicable)   Name:  Address:  Dialysis Schedule:  Phone:  Fax:    / signature:  {Esignature:078238594}    PHYSICIAN SECTION    Prognosis: {Prognosis:2459646420}    Condition at Discharge: { Patient Condition:825039737}    Rehab Potential (if transferring to Rehab): {Prognosis:4217888939}    Recommended Labs or Other Treatments After Discharge: ***    Physician Certification: I certify the above information and transfer of Keith Anderson  is necessary for the continuing treatment of the diagnosis listed and that he requires {Admit to Appropriate Level of Care:79686} for {GREATER/LESS:846666475} 30 days.     Update Admission H&P: {[facility] DME Changes in HandP:483808115}    PHYSICIAN SIGNATURE:  {Esignature:825693182}

## 2025-01-15 NOTE — DISCHARGE INSTRUCTIONS
XR FOOT RIGHT (MIN 3 VIEWS)   Final Result   Cortical irregularity to suggest possible nondisplaced fracture involving the   distal aspect of the 1st metatarsal at the metatarsophalangeal joint.   Clinical correlation is needed for pinpoint tenderness.  Degenerative changes   seen in the 1st metatarsophalangeal joint and 1st interphalangeal joint.         Do not drink any alcohol, drive or operate heavy equipment while taking narcotic pain medication.  Make sure to take food with naproxen to avoid any stomach upset.  Do not take naproxen if you are taking indomethacin.

## (undated) DEVICE — BAG SPECIMEN BIOHAZARD 6IN X 9IN

## (undated) DEVICE — GAUZE,SPONGE,4"X4",8PLY,STRL,LF,10/TRAY: Brand: MEDLINE

## (undated) DEVICE — 4-PORT MANIFOLD: Brand: NEPTUNE 2

## (undated) DEVICE — SWAB SPEC COLL SHFT L5.25IN POLYUR FOAM TIP SFT DBL MEDIA

## (undated) DEVICE — GLOVE SURG SZ 75 CRM LTX FREE POLYISOPRENE POLYMER BEAD ANTI

## (undated) DEVICE — DOUBLE BASIN SET: Brand: MEDLINE INDUSTRIES, INC.

## (undated) DEVICE — GLOVE SURG SZ 8 CRM LTX FREE POLYISOPRENE POLYMER BEAD ANTI

## (undated) DEVICE — GAUZE,PACKING STRIP,IODOFORM,1"X5YD,STRL: Brand: CURAD

## (undated) DEVICE — UNDERPANTS INCONT XL 45-70IN KNIT SEAMLESS DSGN COLOR-CODED

## (undated) DEVICE — SPONGE,LAP,4"X18",XR,ST,5/PK,40PK/CS: Brand: MEDLINE INDUSTRIES, INC.

## (undated) DEVICE — ELECTRODE PT RET AD L9FT HI MOIST COND ADH HYDRGEL CORDED

## (undated) DEVICE — DRAIN SURG PENROSE 0.5X12 IN PREM SIL STRL

## (undated) DEVICE — BLADE CLIPPER GEN PURP NS

## (undated) DEVICE — PAD,ABDOMINAL,5"X9",ST,LF,25/BX: Brand: MEDLINE INDUSTRIES, INC.

## (undated) DEVICE — COVER HNDL LT DISP

## (undated) DEVICE — SWABSTICK MEDICATED L4IN BENZ TINC SKIN PREP APLICARE

## (undated) DEVICE — TOWEL,OR,DSP,ST,BLUE,STD,6/PK,12PK/CS: Brand: MEDLINE

## (undated) DEVICE — DRAPE,LAPAROTOMY,PCH,STERILE: Brand: MEDLINE

## (undated) DEVICE — CHLORAPREP 26ML ORANGE

## (undated) DEVICE — PACK PROCEDURE SURG GEN CUST

## (undated) DEVICE — GOWN,SIRUS,FABRNF,XL,20/CS: Brand: MEDLINE

## (undated) DEVICE — Z CONVERTED USE 2276206 DRESSING RL GZ 2 PLY 2INX4 1 8YD DERMACEA

## (undated) DEVICE — NEEDLE HYPO 25GA L1.5IN BLU POLYPR HUB S STL REG BVL STR